# Patient Record
Sex: FEMALE | Race: WHITE | Employment: FULL TIME | URBAN - METROPOLITAN AREA
[De-identification: names, ages, dates, MRNs, and addresses within clinical notes are randomized per-mention and may not be internally consistent; named-entity substitution may affect disease eponyms.]

---

## 2020-01-04 ENCOUNTER — HOSPITAL ENCOUNTER (EMERGENCY)
Facility: HOSPITAL | Age: 50
Discharge: HOME | End: 2020-01-04
Attending: EMERGENCY MEDICINE
Payer: COMMERCIAL

## 2020-01-04 VITALS
SYSTOLIC BLOOD PRESSURE: 124 MMHG | HEART RATE: 84 BPM | TEMPERATURE: 97 F | RESPIRATION RATE: 16 BRPM | HEIGHT: 59 IN | OXYGEN SATURATION: 100 % | BODY MASS INDEX: 30.24 KG/M2 | WEIGHT: 150 LBS | DIASTOLIC BLOOD PRESSURE: 88 MMHG

## 2020-01-04 DIAGNOSIS — L02.91 ABSCESS: Primary | ICD-10-CM

## 2020-01-04 PROCEDURE — 87077 CULTURE AEROBIC IDENTIFY: CPT | Performed by: EMERGENCY MEDICINE

## 2020-01-04 PROCEDURE — 87205 SMEAR GRAM STAIN: CPT | Performed by: EMERGENCY MEDICINE

## 2020-01-04 PROCEDURE — 87070 CULTURE OTHR SPECIMN AEROBIC: CPT | Performed by: EMERGENCY MEDICINE

## 2020-01-04 PROCEDURE — 99283 EMERGENCY DEPT VISIT LOW MDM: CPT

## 2020-01-04 RX ORDER — CLINDAMYCIN HYDROCHLORIDE 300 MG/1
300 CAPSULE ORAL 3 TIMES DAILY
Qty: 30 CAPSULE | Refills: 0 | Status: SHIPPED | OUTPATIENT
Start: 2020-01-04 | End: 2020-01-14

## 2020-01-04 RX ORDER — LEVOTHYROXINE SODIUM 75 UG/1
CAPSULE ORAL
COMMUNITY

## 2020-01-04 NOTE — ED INITIAL ASSESSMENT (HPI)
Pt states she is currently being treated for an abscess under her R breast. Pt is taking oral antibiotics. Pt states this AM it started draining heavily with an strong odor.

## 2020-01-04 NOTE — ED PROVIDER NOTES
Patient Seen in: [facility] Emergency Department      History   Patient presents with:  Abscess    Stated Complaint: abscess under breast    HPI    Patient is a 73-year-old woman coming for evaluation for draining abscess underneath her bra line.   P toxic-appearing. HENT:      Head: Normocephalic and atraumatic. Eyes:      General: No scleral icterus. Conjunctiva/sclera: Conjunctivae normal.   Neck:      Musculoskeletal: Normal range of motion and neck supple.    Cardiovascular:      Rate and R problems, Return to the ER if you feel worse in any way        Medications Prescribed:  Current Discharge Medication List    START taking these medications    Clindamycin HCl 300 MG Oral Cap  Take 1 capsule (300 mg total) by mouth 3 (three) times daily for

## 2021-07-03 ENCOUNTER — PATIENT MESSAGE (OUTPATIENT)
Dept: FAMILY MEDICINE CLINIC | Facility: CLINIC | Age: 51
End: 2021-07-03

## 2021-07-03 ENCOUNTER — OFFICE VISIT (OUTPATIENT)
Dept: FAMILY MEDICINE CLINIC | Facility: CLINIC | Age: 51
End: 2021-07-03
Payer: COMMERCIAL

## 2021-07-03 VITALS
HEART RATE: 64 BPM | TEMPERATURE: 98 F | RESPIRATION RATE: 16 BRPM | SYSTOLIC BLOOD PRESSURE: 92 MMHG | DIASTOLIC BLOOD PRESSURE: 60 MMHG

## 2021-07-03 DIAGNOSIS — J30.2 SEASONAL ALLERGIC RHINITIS, UNSPECIFIED TRIGGER: ICD-10-CM

## 2021-07-03 DIAGNOSIS — R05.9 COUGH: Primary | ICD-10-CM

## 2021-07-03 LAB
CONTROL LINE PRESENT WITH A CLEAR BACKGROUND (YES/NO): YES YES/NO
KIT LOT #: NORMAL NUMERIC
STREP GRP A CUL-SCR: NEGATIVE

## 2021-07-03 PROCEDURE — 87880 STREP A ASSAY W/OPTIC: CPT | Performed by: NURSE PRACTITIONER

## 2021-07-03 PROCEDURE — 3074F SYST BP LT 130 MM HG: CPT | Performed by: NURSE PRACTITIONER

## 2021-07-03 PROCEDURE — 99213 OFFICE O/P EST LOW 20 MIN: CPT | Performed by: NURSE PRACTITIONER

## 2021-07-03 PROCEDURE — 3078F DIAST BP <80 MM HG: CPT | Performed by: NURSE PRACTITIONER

## 2021-07-03 RX ORDER — TRIAMCINOLONE ACETONIDE 55 UG/1
2 SPRAY, METERED NASAL 2 TIMES DAILY
Qty: 1 EACH | Refills: 1 | Status: SHIPPED | OUTPATIENT
Start: 2021-07-03 | End: 2021-10-15 | Stop reason: ALTCHOICE

## 2021-07-03 RX ORDER — BENZONATATE 200 MG/1
CAPSULE ORAL
Qty: 30 CAPSULE | Refills: 0 | Status: SHIPPED | OUTPATIENT
Start: 2021-07-03 | End: 2021-10-15 | Stop reason: ALTCHOICE

## 2021-07-03 NOTE — PROGRESS NOTES
CHIEF COMPLAINT:   \" nasal congestion, scratchy throat, cough \"  HPI:   Penny Pearl is a 53year old female who presents to the [facility] with symptoms of a runny, congested nose, a scratchy throat and a dry cough that seems to be caused by postnasal dr pain  NEURO: Denies headaches    EXAM:   BP 92/60   Pulse 64   Temp 98.4 °F (36.9 °C) (Temporal)   Resp 16   GENERAL: well developed, well nourished. Well appearing. SKIN: no rashes,no suspicious lesions  HEAD: atraumatic, normocephalic.    EYES: conjunct

## 2021-07-04 LAB — SARS-COV-2 RNA RESP QL NAA+PROBE: NOT DETECTED

## 2021-10-23 ENCOUNTER — TELEPHONE (OUTPATIENT)
Dept: OBGYN UNIT | Facility: HOSPITAL | Age: 51
End: 2021-10-23

## 2021-12-10 ENCOUNTER — TELEPHONE (OUTPATIENT)
Dept: PERINATAL CARE | Facility: HOSPITAL | Age: 51
End: 2021-12-10

## 2021-12-10 NOTE — TELEPHONE ENCOUNTER
I answered Mariza's question in regards to the COVID-19 vaccination booster. I strongly encouraged her to get this vaccine. I also answered her questions regarding gestational ages and risk of travel.   We discussed that air travel is not inherently ris

## 2021-12-10 NOTE — TELEPHONE ENCOUNTER
I left a message letting her know I was returning her phone call and provided my office phone number for her to return my call.

## 2021-12-15 NOTE — PROGRESS NOTES
Outpatient Maternal-Fetal Medicine Consultation    Dear Dr. Ashwin Booker,    Thank you for requesting ultrasound evaluation and maternal fetal medicine consultation on your patient Liang Graves.   As you are aware she is a 49year old female with a Singleto Metabolic disorder, or Sickle-cell anemia ([facility] [address] Past Surgical History  The patient  has a past surgical history that includes Eye surgery; skin graft procedure; other surgical history; and other surgical history.     Family History  The patient She antwan visualized and appeared short by transabdominal ultrasound, therefore transvaginal ultrasound was performed.  Transvaginal US findings: Cervix is closed, long and no funneling seen measuring 37.5 mm     The fetal measurements are consistent with the establi rate of:  · Fetal malformations  · Preeclampsia  · Gestational diabetes  · Intrauterine fetal death    As a result, enhanced pregnancy surveillance is advised for these patients including a comprehensive ultrasound to assess for fetal malformations (at 21 risk factors.      Fetal Death        A decision analysis tool using data from the [employer] [employer]  [employer] predicted a strategy of weekly antepartum testing and labor induction would lower the risk of unexplained fetal death in women 35 years o offers the highest detection rate (with the lowest false positive rate) for the detection of fetal aneuploidy amongst high-risk patients. The limitations of detailed mid-trimester sonography was reviewed with the patient.  First trimester screening and seco reference range with low free thyroxine [T4]) should be treated with thyroid hormone (T4).  In addition, because maternal euthyroidism is potentially important for normal fetal cognitive development, it is suggested to treat pregnant women with subclinical also been hypothesized that obesity may lead to dystocia due to increased soft tissue deposition in the maternal pelvis.     delivery in the obese  is associated with numerous perioperative concerns, including emergency delivery, prolonged in or stationary bike in the pregnancy. COVID-19 vaccine in pregnancy -   She got the booster dose of the COVID-19 vaccine on December 18, 2021. FIBROIDS:    I informed her of the fibroids.   Fibroids can increase in size during pregnancy and could l cavity was not violated, the patient is likely to still have an increased risk of uterine rupture. We discussed the recommended plan of care based on her  risk factors.   Vinh Jain had her questions answered to her satisfaction    IMPRESSION the practitioner.

## 2021-12-18 ENCOUNTER — HOSPITAL ENCOUNTER (EMERGENCY)
Facility: HOSPITAL | Age: 51
Discharge: HOME | End: 2021-12-18
Attending: EMERGENCY MEDICINE
Payer: COMMERCIAL

## 2021-12-18 ENCOUNTER — APPOINTMENT (OUTPATIENT)
Dept: ULTRASOUND IMAGING | Facility: HOSPITAL | Age: 51
End: 2021-12-18
Attending: EMERGENCY MEDICINE
Payer: COMMERCIAL

## 2021-12-18 VITALS
HEART RATE: 88 BPM | SYSTOLIC BLOOD PRESSURE: 116 MMHG | OXYGEN SATURATION: 99 % | WEIGHT: 146 LBS | RESPIRATION RATE: 16 BRPM | BODY MASS INDEX: 29.43 KG/M2 | HEIGHT: 59 IN | TEMPERATURE: 98 F | DIASTOLIC BLOOD PRESSURE: 77 MMHG

## 2021-12-18 DIAGNOSIS — O20.0 THREATENED MISCARRIAGE: Primary | ICD-10-CM

## 2021-12-18 PROCEDURE — 96360 HYDRATION IV INFUSION INIT: CPT

## 2021-12-18 PROCEDURE — 76815 OB US LIMITED FETUS(S): CPT | Performed by: EMERGENCY MEDICINE

## 2021-12-18 PROCEDURE — 86900 BLOOD TYPING SEROLOGIC ABO: CPT | Performed by: EMERGENCY MEDICINE

## 2021-12-18 PROCEDURE — 96361 HYDRATE IV INFUSION ADD-ON: CPT

## 2021-12-18 PROCEDURE — 85025 COMPLETE CBC W/AUTO DIFF WBC: CPT | Performed by: EMERGENCY MEDICINE

## 2021-12-18 PROCEDURE — 84702 CHORIONIC GONADOTROPIN TEST: CPT | Performed by: EMERGENCY MEDICINE

## 2021-12-18 PROCEDURE — 80053 COMPREHEN METABOLIC PANEL: CPT | Performed by: EMERGENCY MEDICINE

## 2021-12-18 PROCEDURE — 99285 EMERGENCY DEPT VISIT HI MDM: CPT

## 2021-12-18 PROCEDURE — 86901 BLOOD TYPING SEROLOGIC RH(D): CPT | Performed by: EMERGENCY MEDICINE

## 2021-12-18 PROCEDURE — 99284 EMERGENCY DEPT VISIT MOD MDM: CPT

## 2021-12-19 NOTE — ED PROVIDER NOTES
Patient Seen in: [facility] Emergency Department      History   Patient presents with:  Pregnancy Issues    Stated Complaint: vaginal bleeding with clots    Subjective:   HPI    The patient is a 16-year-old pregnant G1, P0 female who is EGA is 23 we BMI 29.49 kg/m²         Physical Exam    General: Comfortable and well appearing. Alert and oriented in no distress. Neuro: No focal neurologic deficits.  Grossly normal and symmetric motor strength and sensation proximally and distally throughout all 4 ex ---------                               -----------         ------                     CBC W/ DIFFERENTIAL[[id number]]          Abnormal            Final result                 Please view results for these tests on the individual orders.    [address] (BL There is no other abnormality detected on this limited ultrasound.                      Dictated by (CST): Abram Saldaña MD on 12/18/2021 at 9:18 PM         Finalized by (CST): Abram Saldaña MD on 12/18/2021 at 9:21 PM              Patient did not have any

## 2021-12-21 ENCOUNTER — ULTRASOUND ENCOUNTER (OUTPATIENT)
Dept: PERINATAL CARE | Facility: HOSPITAL | Age: 51
End: 2021-12-21
Attending: OBSTETRICS & GYNECOLOGY
Payer: COMMERCIAL

## 2021-12-21 VITALS
HEART RATE: 105 BPM | WEIGHT: 150 LBS | DIASTOLIC BLOOD PRESSURE: 79 MMHG | SYSTOLIC BLOOD PRESSURE: 113 MMHG | BODY MASS INDEX: 30 KG/M2

## 2021-12-21 DIAGNOSIS — O09.812 PREGNANCY RESULTING FROM ASSISTED REPRODUCTIVE TECHNOLOGY IN SECOND TRIMESTER: ICD-10-CM

## 2021-12-21 DIAGNOSIS — O09.512 PRIMIGRAVIDA OF ADVANCED MATERNAL AGE IN SECOND TRIMESTER: ICD-10-CM

## 2021-12-21 DIAGNOSIS — O09.522 MULTIGRAVIDA OF ADVANCED MATERNAL AGE IN SECOND TRIMESTER: Primary | ICD-10-CM

## 2021-12-21 DIAGNOSIS — O99.282 HYPOTHYROIDISM AFFECTING PREGNANCY IN SECOND TRIMESTER: ICD-10-CM

## 2021-12-21 DIAGNOSIS — O34.12 LEIOMYOMA OF UTERUS AFFECTING PREGNANCY IN SECOND TRIMESTER: ICD-10-CM

## 2021-12-21 DIAGNOSIS — O09.522 MULTIGRAVIDA OF ADVANCED MATERNAL AGE IN SECOND TRIMESTER: ICD-10-CM

## 2021-12-21 DIAGNOSIS — D25.9 LEIOMYOMA OF UTERUS AFFECTING PREGNANCY IN SECOND TRIMESTER: ICD-10-CM

## 2021-12-21 DIAGNOSIS — E03.9 HYPOTHYROIDISM AFFECTING PREGNANCY IN SECOND TRIMESTER: ICD-10-CM

## 2021-12-21 DIAGNOSIS — O09.819 ENCOUNTER FOR SUPERVISION OF PREGNANCY RESULTING FROM ASSISTED REPRODUCTIVE TECHNOLOGY: ICD-10-CM

## 2021-12-21 PROCEDURE — 76817 TRANSVAGINAL US OBSTETRIC: CPT

## 2021-12-21 PROCEDURE — 99244 OFF/OP CNSLTJ NEW/EST MOD 40: CPT | Performed by: OBSTETRICS & GYNECOLOGY

## 2021-12-21 PROCEDURE — 76811 OB US DETAILED SNGL FETUS: CPT | Performed by: OBSTETRICS & GYNECOLOGY

## 2021-12-21 PROCEDURE — 76817 TRANSVAGINAL US OBSTETRIC: CPT | Performed by: OBSTETRICS & GYNECOLOGY

## 2021-12-21 NOTE — PROGRESS NOTES
Pt here for Level II Ultrasound/IVF/AMA/Hypothyroid  Pt denies fm   Pt c/o intermittent nausea, pt taking Diclegis. Pt was seen in ER 12/18 for bleeding/clots,pt noted no recurrent bleeding noted. Abd soft,nontender. Pt denies UC's, pain.

## 2022-01-14 NOTE — PROGRESS NOTES
Romario Mederos     Dear Dr. Home Tinajero,     Thank you for requesting ultrasound evaluation and maternal fetal medicine consultation on your patient Debora Rivas.   As you are aware she is a 49year old female  with a [place] There is a right to left shunting across the patent fritz ovale. There is a normal appearance of the aorta and branching pattern of the head vessels. There appears to be a structurally  normal fetal heart and rhythm.  The patient was made aware of the l reviewed. See MFM note from 2021 for detailed review     OBESITY:  Her BMI prior to pregnancy was 31.3  Obesity during pregnancy is associated with numerous maternal and  risks which were discussed previously and reviewed.   See M note fro care.  Our discussion is summarized above. The approximate physician face-to-face time was 30 minutes. Note to patient and family:  The [facility] makes medical notes available to patients in the interest of transparency.   However, please b

## 2022-01-21 ENCOUNTER — ULTRASOUND ENCOUNTER (OUTPATIENT)
Dept: PERINATAL CARE | Facility: HOSPITAL | Age: 52
End: 2022-01-21
Attending: OBSTETRICS & GYNECOLOGY
Payer: COMMERCIAL

## 2022-01-21 VITALS
SYSTOLIC BLOOD PRESSURE: 119 MMHG | BODY MASS INDEX: 31 KG/M2 | DIASTOLIC BLOOD PRESSURE: 76 MMHG | WEIGHT: 153 LBS | HEART RATE: 123 BPM

## 2022-01-21 DIAGNOSIS — O09.522 MULTIGRAVIDA OF ADVANCED MATERNAL AGE IN SECOND TRIMESTER: ICD-10-CM

## 2022-01-21 DIAGNOSIS — O09.522 MULTIGRAVIDA OF ADVANCED MATERNAL AGE IN SECOND TRIMESTER: Primary | ICD-10-CM

## 2022-01-21 DIAGNOSIS — D25.9 LEIOMYOMA OF UTERUS AFFECTING PREGNANCY IN SECOND TRIMESTER: ICD-10-CM

## 2022-01-21 DIAGNOSIS — E03.9 HYPOTHYROIDISM AFFECTING PREGNANCY IN SECOND TRIMESTER: ICD-10-CM

## 2022-01-21 DIAGNOSIS — O09.819 ENCOUNTER FOR SUPERVISION OF PREGNANCY RESULTING FROM ASSISTED REPRODUCTIVE TECHNOLOGY: ICD-10-CM

## 2022-01-21 DIAGNOSIS — O34.12 LEIOMYOMA OF UTERUS AFFECTING PREGNANCY IN SECOND TRIMESTER: ICD-10-CM

## 2022-01-21 DIAGNOSIS — Z98.890 H/O MYOMECTOMY: ICD-10-CM

## 2022-01-21 DIAGNOSIS — O99.282 HYPOTHYROIDISM AFFECTING PREGNANCY IN SECOND TRIMESTER: ICD-10-CM

## 2022-01-21 DIAGNOSIS — O09.513 PRIMIGRAVIDA OF ADVANCED MATERNAL AGE IN THIRD TRIMESTER: ICD-10-CM

## 2022-01-21 DIAGNOSIS — O09.812 PREGNANCY RESULTING FROM ASSISTED REPRODUCTIVE TECHNOLOGY IN SECOND TRIMESTER: ICD-10-CM

## 2022-01-21 PROBLEM — O09.519 AMA (ADVANCED MATERNAL AGE) PRIMIGRAVIDA 35+: Status: ACTIVE | Noted: 2022-01-21

## 2022-01-21 PROBLEM — E03.8 OTHER SPECIFIED HYPOTHYROIDISM: Status: ACTIVE | Noted: 2022-01-21

## 2022-01-21 PROBLEM — O09.519 AMA (ADVANCED MATERNAL AGE) PRIMIGRAVIDA 35+ (HCC): Status: ACTIVE | Noted: 2022-01-21

## 2022-01-21 PROCEDURE — 76825 ECHO EXAM OF FETAL HEART: CPT | Performed by: OBSTETRICS & GYNECOLOGY

## 2022-01-21 PROCEDURE — 76816 OB US FOLLOW-UP PER FETUS: CPT | Performed by: OBSTETRICS & GYNECOLOGY

## 2022-01-21 PROCEDURE — 76827 ECHO EXAM OF FETAL HEART: CPT | Performed by: OBSTETRICS & GYNECOLOGY

## 2022-01-21 PROCEDURE — 76816 OB US FOLLOW-UP PER FETUS: CPT

## 2022-01-21 PROCEDURE — 76827 ECHO EXAM OF FETAL HEART: CPT

## 2022-01-21 PROCEDURE — 93325 DOPPLER ECHO COLOR FLOW MAPG: CPT | Performed by: OBSTETRICS & GYNECOLOGY

## 2022-01-21 PROCEDURE — 93325 DOPPLER ECHO COLOR FLOW MAPG: CPT

## 2022-01-21 PROCEDURE — 99214 OFFICE O/P EST MOD 30 MIN: CPT | Performed by: OBSTETRICS & GYNECOLOGY

## 2022-01-23 ENCOUNTER — HOSPITAL ENCOUNTER (OUTPATIENT)
Age: 52
Discharge: HOME | End: 2022-01-23
Payer: COMMERCIAL

## 2022-01-23 VITALS
TEMPERATURE: 97 F | HEART RATE: 99 BPM | SYSTOLIC BLOOD PRESSURE: 118 MMHG | HEIGHT: 60 IN | DIASTOLIC BLOOD PRESSURE: 70 MMHG | WEIGHT: 151 LBS | BODY MASS INDEX: 29.64 KG/M2 | OXYGEN SATURATION: 98 % | RESPIRATION RATE: 16 BRPM

## 2022-01-23 DIAGNOSIS — Z20.822 EXPOSURE TO COVID-19 VIRUS: Primary | ICD-10-CM

## 2022-01-23 DIAGNOSIS — U07.1 COVID-19: ICD-10-CM

## 2022-01-23 LAB — SARS-COV-2 RNA RESP QL NAA+PROBE: DETECTED

## 2022-01-23 PROCEDURE — 99213 OFFICE O/P EST LOW 20 MIN: CPT | Performed by: NURSE PRACTITIONER

## 2022-01-23 PROCEDURE — U0002 COVID-19 LAB TEST NON-CDC: HCPCS | Performed by: NURSE PRACTITIONER

## 2022-01-23 RX ORDER — ASPIRIN 81 MG/1
81 TABLET, CHEWABLE ORAL DAILY
COMMUNITY

## 2022-01-23 NOTE — ED INITIAL ASSESSMENT (HPI)
Patient c/o nasal congestion, sore throat and cough for 1 week. States she took a home Covid test today that was +. Patient is 6 months pregnant.

## 2022-01-23 NOTE — ED PROVIDER NOTES
Patient Seen in: [facility]      History   Patient presents with:  Testing    Stated Complaint: Testing    Subjective:   69-year-old female presents today with body aches, fatigue congestion and sore throat. Concern for possible COVID-19.   St Resp 16   Temp 97 °F (36.1 °C)   Temp src Temporal   SpO2 98 %   O2 Device None (Room air)       Current:/70   Pulse 99   Temp 97 °F (36.1 °C) (Temporal)   Resp 16   Ht 152.4 cm (5')   Wt 68.5 kg   SpO2 98%   Breastfeeding No   BMI 29.49 kg/m² antihistamine cough suppressant as needed. To follow-up with primary MD 14 days after quarantine ends for reevaluation. Patient verbalized understanding agree with plan of care.                                 Disposition and Plan     Clinical Impression

## 2022-01-25 PROBLEM — O98.512 COVID-19 AFFECTING PREGNANCY IN SECOND TRIMESTER: Status: ACTIVE | Noted: 2022-01-23

## 2022-01-25 PROBLEM — U07.1 COVID-19 AFFECTING PREGNANCY IN SECOND TRIMESTER: Status: ACTIVE | Noted: 2022-01-23

## 2022-01-25 PROBLEM — U07.1 COVID-19 AFFECTING PREGNANCY IN SECOND TRIMESTER (HCC): Status: ACTIVE | Noted: 2022-01-23

## 2022-01-25 PROBLEM — O98.512 COVID-19 AFFECTING PREGNANCY IN SECOND TRIMESTER (HCC): Status: ACTIVE | Noted: 2022-01-23

## 2022-02-18 ENCOUNTER — OFFICE VISIT (OUTPATIENT)
Dept: PERINATAL CARE | Facility: HOSPITAL | Age: 52
End: 2022-02-18
Attending: OBSTETRICS & GYNECOLOGY
Payer: COMMERCIAL

## 2022-02-18 VITALS
DIASTOLIC BLOOD PRESSURE: 59 MMHG | WEIGHT: 153 LBS | SYSTOLIC BLOOD PRESSURE: 121 MMHG | HEART RATE: 122 BPM | HEIGHT: 59 IN | BODY MASS INDEX: 30.84 KG/M2

## 2022-02-18 DIAGNOSIS — O09.513 PRIMIGRAVIDA OF ADVANCED MATERNAL AGE IN THIRD TRIMESTER: ICD-10-CM

## 2022-02-18 DIAGNOSIS — O98.513 COVID-19 AFFECTING PREGNANCY IN THIRD TRIMESTER: ICD-10-CM

## 2022-02-18 DIAGNOSIS — O24.410 DIET CONTROLLED GESTATIONAL DIABETES MELLITUS (GDM) IN THIRD TRIMESTER: ICD-10-CM

## 2022-02-18 DIAGNOSIS — U07.1 COVID-19 AFFECTING PREGNANCY IN THIRD TRIMESTER: ICD-10-CM

## 2022-02-18 DIAGNOSIS — O34.12 LEIOMYOMA OF UTERUS AFFECTING PREGNANCY IN SECOND TRIMESTER: ICD-10-CM

## 2022-02-18 DIAGNOSIS — U07.1 COVID-19 AFFECTING PREGNANCY IN SECOND TRIMESTER: ICD-10-CM

## 2022-02-18 DIAGNOSIS — O09.523 MULTIGRAVIDA OF ADVANCED MATERNAL AGE IN THIRD TRIMESTER: Primary | ICD-10-CM

## 2022-02-18 DIAGNOSIS — D25.9 LEIOMYOMA OF UTERUS AFFECTING PREGNANCY IN SECOND TRIMESTER: ICD-10-CM

## 2022-02-18 DIAGNOSIS — O24.419 GDM (GESTATIONAL DIABETES MELLITUS): ICD-10-CM

## 2022-02-18 DIAGNOSIS — O98.512 COVID-19 AFFECTING PREGNANCY IN SECOND TRIMESTER: ICD-10-CM

## 2022-02-18 DIAGNOSIS — Z98.890 H/O MYOMECTOMY: ICD-10-CM

## 2022-02-18 DIAGNOSIS — O09.813 PREGNANCY RESULTING FROM ASSISTED REPRODUCTIVE TECHNOLOGY IN THIRD TRIMESTER: ICD-10-CM

## 2022-02-18 DIAGNOSIS — O09.523 MULTIGRAVIDA OF ADVANCED MATERNAL AGE IN THIRD TRIMESTER: ICD-10-CM

## 2022-02-18 DIAGNOSIS — O99.213 OBESITY AFFECTING PREGNANCY IN THIRD TRIMESTER: ICD-10-CM

## 2022-02-18 PROCEDURE — 99214 OFFICE O/P EST MOD 30 MIN: CPT | Performed by: OBSTETRICS & GYNECOLOGY

## 2022-02-18 PROCEDURE — 76816 OB US FOLLOW-UP PER FETUS: CPT | Performed by: OBSTETRICS & GYNECOLOGY

## 2022-03-18 ENCOUNTER — ULTRASOUND ENCOUNTER (OUTPATIENT)
Dept: PERINATAL CARE | Facility: HOSPITAL | Age: 52
End: 2022-03-18
Attending: OBSTETRICS & GYNECOLOGY
Payer: COMMERCIAL

## 2022-03-18 VITALS
HEART RATE: 108 BPM | SYSTOLIC BLOOD PRESSURE: 116 MMHG | DIASTOLIC BLOOD PRESSURE: 78 MMHG | WEIGHT: 153.38 LBS | HEIGHT: 59 IN | BODY MASS INDEX: 30.92 KG/M2

## 2022-03-18 DIAGNOSIS — D25.9 LEIOMYOMA OF UTERUS AFFECTING PREGNANCY IN SECOND TRIMESTER: ICD-10-CM

## 2022-03-18 DIAGNOSIS — O34.13 LEIOMYOMA OF UTERUS AFFECTING PREGNANCY IN THIRD TRIMESTER: ICD-10-CM

## 2022-03-18 DIAGNOSIS — U07.1 COVID-19 AFFECTING PREGNANCY IN THIRD TRIMESTER: ICD-10-CM

## 2022-03-18 DIAGNOSIS — Z98.890 H/O MYOMECTOMY: ICD-10-CM

## 2022-03-18 DIAGNOSIS — O98.513 COVID-19 AFFECTING PREGNANCY IN THIRD TRIMESTER: ICD-10-CM

## 2022-03-18 DIAGNOSIS — E03.9 HYPOTHYROIDISM AFFECTING PREGNANCY IN THIRD TRIMESTER: ICD-10-CM

## 2022-03-18 DIAGNOSIS — O99.283 HYPOTHYROIDISM AFFECTING PREGNANCY IN THIRD TRIMESTER: ICD-10-CM

## 2022-03-18 DIAGNOSIS — O09.813 PREGNANCY RESULTING FROM ASSISTED REPRODUCTIVE TECHNOLOGY IN THIRD TRIMESTER: ICD-10-CM

## 2022-03-18 DIAGNOSIS — D25.9 LEIOMYOMA OF UTERUS AFFECTING PREGNANCY IN THIRD TRIMESTER: ICD-10-CM

## 2022-03-18 DIAGNOSIS — O24.410 DIET CONTROLLED GESTATIONAL DIABETES MELLITUS (GDM) IN THIRD TRIMESTER: ICD-10-CM

## 2022-03-18 DIAGNOSIS — O09.513 PRIMIGRAVIDA OF ADVANCED MATERNAL AGE IN THIRD TRIMESTER: ICD-10-CM

## 2022-03-18 DIAGNOSIS — O34.12 LEIOMYOMA OF UTERUS AFFECTING PREGNANCY IN SECOND TRIMESTER: ICD-10-CM

## 2022-03-18 DIAGNOSIS — O24.410 DIET CONTROLLED GESTATIONAL DIABETES MELLITUS (GDM) IN THIRD TRIMESTER: Primary | ICD-10-CM

## 2022-03-18 PROCEDURE — 76816 OB US FOLLOW-UP PER FETUS: CPT | Performed by: OBSTETRICS & GYNECOLOGY

## 2022-03-18 PROCEDURE — 99214 OFFICE O/P EST MOD 30 MIN: CPT | Performed by: OBSTETRICS & GYNECOLOGY

## 2022-03-18 PROCEDURE — 76819 FETAL BIOPHYS PROFIL W/O NST: CPT

## 2022-03-18 PROCEDURE — 76819 FETAL BIOPHYS PROFIL W/O NST: CPT | Performed by: OBSTETRICS & GYNECOLOGY

## 2022-03-18 RX ORDER — MELATONIN
325
COMMUNITY

## 2022-04-11 ENCOUNTER — HOSPITAL ENCOUNTER (OUTPATIENT)
Facility: HOSPITAL | Age: 52
Discharge: HOME | End: 2022-04-11
Attending: OBSTETRICS & GYNECOLOGY | Admitting: OBSTETRICS & GYNECOLOGY
Payer: COMMERCIAL

## 2022-04-11 PROCEDURE — 96372 THER/PROPH/DIAG INJ SC/IM: CPT

## 2022-04-11 RX ORDER — BETAMETHASONE SODIUM PHOSPHATE AND BETAMETHASONE ACETATE 3; 3 MG/ML; MG/ML
INJECTION, SUSPENSION INTRA-ARTICULAR; INTRALESIONAL; INTRAMUSCULAR; SOFT TISSUE
Status: COMPLETED
Start: 2022-04-11 | End: 2022-04-11

## 2022-04-11 RX ORDER — BETAMETHASONE SODIUM PHOSPHATE AND BETAMETHASONE ACETATE 3; 3 MG/ML; MG/ML
12 INJECTION, SUSPENSION INTRA-ARTICULAR; INTRALESIONAL; INTRAMUSCULAR; SOFT TISSUE ONCE
Status: COMPLETED | OUTPATIENT
Start: 2022-04-11 | End: 2022-04-11

## 2022-04-12 ENCOUNTER — HOSPITAL ENCOUNTER (OUTPATIENT)
Facility: HOSPITAL | Age: 52
Discharge: HOME | End: 2022-04-12
Attending: OBSTETRICS & GYNECOLOGY | Admitting: OBSTETRICS & GYNECOLOGY
Payer: COMMERCIAL

## 2022-04-12 ENCOUNTER — TELEPHONE (OUTPATIENT)
Dept: OBGYN UNIT | Facility: HOSPITAL | Age: 52
End: 2022-04-12

## 2022-04-12 PROCEDURE — 96372 THER/PROPH/DIAG INJ SC/IM: CPT

## 2022-04-12 RX ORDER — BETAMETHASONE SODIUM PHOSPHATE AND BETAMETHASONE ACETATE 3; 3 MG/ML; MG/ML
INJECTION, SUSPENSION INTRA-ARTICULAR; INTRALESIONAL; INTRAMUSCULAR; SOFT TISSUE
Status: COMPLETED
Start: 2022-04-12 | End: 2022-04-12

## 2022-04-12 RX ORDER — BETAMETHASONE SODIUM PHOSPHATE AND BETAMETHASONE ACETATE 3; 3 MG/ML; MG/ML
12 INJECTION, SUSPENSION INTRA-ARTICULAR; INTRALESIONAL; INTRAMUSCULAR; SOFT TISSUE ONCE
Status: COMPLETED | OUTPATIENT
Start: 2022-04-12 | End: 2022-04-12

## 2022-04-12 NOTE — PROGRESS NOTES
Pt here for first Betamethasone shot. Given to pt in right upper gluteal. Questions answered regarding future admission and delivery. Pt escorted out by this RN.

## 2022-04-13 NOTE — PROGRESS NOTES
Pt admitted to Trg 3 for 2nd betamethasone shot. See MAR. Questions answered. Pt verbalized understanding of instructions given.

## 2022-04-18 ENCOUNTER — ANESTHESIA (OUTPATIENT)
Dept: OBGYN UNIT | Facility: HOSPITAL | Age: 52
End: 2022-04-18
Payer: COMMERCIAL

## 2022-04-18 ENCOUNTER — HOSPITAL ENCOUNTER (INPATIENT)
Facility: HOSPITAL | Age: 52
LOS: 3 days | Discharge: HOME | End: 2022-04-21
Attending: OBSTETRICS & GYNECOLOGY | Admitting: OBSTETRICS & GYNECOLOGY
Payer: COMMERCIAL

## 2022-04-18 ENCOUNTER — ANESTHESIA EVENT (OUTPATIENT)
Dept: OBGYN UNIT | Facility: HOSPITAL | Age: 52
End: 2022-04-18
Payer: COMMERCIAL

## 2022-04-18 PROBLEM — Z34.90 PREGNANCY (HCC): Status: ACTIVE | Noted: 2022-04-18

## 2022-04-18 PROBLEM — Z34.90 PREGNANCY: Status: ACTIVE | Noted: 2022-04-18

## 2022-04-18 LAB
ANTIBODY SCREEN: NEGATIVE
BASOPHILS # BLD AUTO: 0.03 X10(3) UL (ref 0–0.2)
BASOPHILS # BLD AUTO: 0.03 X10(3) UL (ref 0–0.2)
BASOPHILS NFR BLD AUTO: 0.2 %
BASOPHILS NFR BLD AUTO: 0.3 %
EOSINOPHIL # BLD AUTO: 0.01 X10(3) UL (ref 0–0.7)
EOSINOPHIL # BLD AUTO: 0.13 X10(3) UL (ref 0–0.7)
EOSINOPHIL NFR BLD AUTO: 0.1 %
EOSINOPHIL NFR BLD AUTO: 1.3 %
ERYTHROCYTE [DISTWIDTH] IN BLOOD BY AUTOMATED COUNT: 14 %
ERYTHROCYTE [DISTWIDTH] IN BLOOD BY AUTOMATED COUNT: 14.1 %
GLUCOSE BLD-MCNC: 89 MG/DL (ref 70–99)
HCT VFR BLD AUTO: 35.3 %
HCT VFR BLD AUTO: 41.1 %
HGB BLD-MCNC: 11.3 G/DL
HGB BLD-MCNC: 13.5 G/DL
IMM GRANULOCYTES # BLD AUTO: 0.06 X10(3) UL (ref 0–1)
IMM GRANULOCYTES # BLD AUTO: 0.08 X10(3) UL (ref 0–1)
IMM GRANULOCYTES NFR BLD: 0.6 %
IMM GRANULOCYTES NFR BLD: 0.6 %
LYMPHOCYTES # BLD AUTO: 1.94 X10(3) UL (ref 1–4)
LYMPHOCYTES # BLD AUTO: 2.94 X10(3) UL (ref 1–4)
LYMPHOCYTES NFR BLD AUTO: 14.3 %
LYMPHOCYTES NFR BLD AUTO: 28.7 %
MCH RBC QN AUTO: 29.5 PG (ref 26–34)
MCH RBC QN AUTO: 29.6 PG (ref 26–34)
MCHC RBC AUTO-ENTMCNC: 32 G/DL (ref 31–37)
MCHC RBC AUTO-ENTMCNC: 32.8 G/DL (ref 31–37)
MCV RBC AUTO: 89.7 FL
MCV RBC AUTO: 92.4 FL
MONOCYTES # BLD AUTO: 0.83 X10(3) UL (ref 0.1–1)
MONOCYTES # BLD AUTO: 1.14 X10(3) UL (ref 0.1–1)
MONOCYTES NFR BLD AUTO: 8.1 %
MONOCYTES NFR BLD AUTO: 8.4 %
NEUTROPHILS # BLD AUTO: 10.37 X10 (3) UL (ref 1.5–7.7)
NEUTROPHILS # BLD AUTO: 10.37 X10(3) UL (ref 1.5–7.7)
NEUTROPHILS # BLD AUTO: 6.27 X10 (3) UL (ref 1.5–7.7)
NEUTROPHILS # BLD AUTO: 6.27 X10(3) UL (ref 1.5–7.7)
NEUTROPHILS NFR BLD AUTO: 61 %
NEUTROPHILS NFR BLD AUTO: 76.4 %
PLATELET # BLD AUTO: 204 10(3)UL (ref 150–450)
PLATELET # BLD AUTO: 249 10(3)UL (ref 150–450)
RBC # BLD AUTO: 3.82 X10(6)UL
RBC # BLD AUTO: 4.58 X10(6)UL
RH BLOOD TYPE: POSITIVE
T PALLIDUM AB SER QL IA: NONREACTIVE
WBC # BLD AUTO: 10.3 X10(3) UL (ref 4–11)
WBC # BLD AUTO: 13.6 X10(3) UL (ref 4–11)

## 2022-04-18 PROCEDURE — 86780 TREPONEMA PALLIDUM: CPT | Performed by: OBSTETRICS & GYNECOLOGY

## 2022-04-18 PROCEDURE — 86850 RBC ANTIBODY SCREEN: CPT | Performed by: OBSTETRICS & GYNECOLOGY

## 2022-04-18 PROCEDURE — 86900 BLOOD TYPING SEROLOGIC ABO: CPT | Performed by: OBSTETRICS & GYNECOLOGY

## 2022-04-18 PROCEDURE — 82947 ASSAY GLUCOSE BLOOD QUANT: CPT | Performed by: OBSTETRICS & GYNECOLOGY

## 2022-04-18 PROCEDURE — 85025 COMPLETE CBC W/AUTO DIFF WBC: CPT | Performed by: OBSTETRICS & GYNECOLOGY

## 2022-04-18 PROCEDURE — 88307 TISSUE EXAM BY PATHOLOGIST: CPT | Performed by: OBSTETRICS & GYNECOLOGY

## 2022-04-18 PROCEDURE — 86901 BLOOD TYPING SEROLOGIC RH(D): CPT | Performed by: OBSTETRICS & GYNECOLOGY

## 2022-04-18 RX ORDER — PHENYLEPHRINE HCL 10 MG/ML
VIAL (ML) INJECTION AS NEEDED
Status: DISCONTINUED | OUTPATIENT
Start: 2022-04-18 | End: 2022-04-18 | Stop reason: SURG

## 2022-04-18 RX ORDER — NALBUPHINE HCL 10 MG/ML
2.5 AMPUL (ML) INJECTION
Status: DISCONTINUED | OUTPATIENT
Start: 2022-04-18 | End: 2022-04-18

## 2022-04-18 RX ORDER — NALOXONE HYDROCHLORIDE 0.4 MG/ML
0.08 INJECTION, SOLUTION INTRAMUSCULAR; INTRAVENOUS; SUBCUTANEOUS
Status: ACTIVE | OUTPATIENT
Start: 2022-04-18 | End: 2022-04-19

## 2022-04-18 RX ORDER — METHYLERGONOVINE MALEATE 0.2 MG/ML
INJECTION INTRAVENOUS
Status: COMPLETED
Start: 2022-04-18 | End: 2022-04-18

## 2022-04-18 RX ORDER — METOCLOPRAMIDE HYDROCHLORIDE 5 MG/ML
INJECTION INTRAMUSCULAR; INTRAVENOUS AS NEEDED
Status: DISCONTINUED | OUTPATIENT
Start: 2022-04-18 | End: 2022-04-18 | Stop reason: SURG

## 2022-04-18 RX ORDER — NALBUPHINE HCL 10 MG/ML
2.5 AMPUL (ML) INJECTION EVERY 4 HOURS PRN
Status: DISCONTINUED | OUTPATIENT
Start: 2022-04-18 | End: 2022-04-21

## 2022-04-18 RX ORDER — KETOROLAC TROMETHAMINE 30 MG/ML
INJECTION, SOLUTION INTRAMUSCULAR; INTRAVENOUS
Status: COMPLETED
Start: 2022-04-18 | End: 2022-04-18

## 2022-04-18 RX ORDER — SODIUM CHLORIDE, SODIUM LACTATE, POTASSIUM CHLORIDE, CALCIUM CHLORIDE 600; 310; 30; 20 MG/100ML; MG/100ML; MG/100ML; MG/100ML
INJECTION, SOLUTION INTRAVENOUS CONTINUOUS
Status: DISCONTINUED | OUTPATIENT
Start: 2022-04-18 | End: 2022-04-18

## 2022-04-18 RX ORDER — LEVOTHYROXINE SODIUM 0.07 MG/1
75 TABLET ORAL
Status: DISCONTINUED | OUTPATIENT
Start: 2022-04-18 | End: 2022-04-21

## 2022-04-18 RX ORDER — ONDANSETRON 2 MG/ML
4 INJECTION INTRAMUSCULAR; INTRAVENOUS ONCE AS NEEDED
Status: DISCONTINUED | OUTPATIENT
Start: 2022-04-18 | End: 2022-04-18 | Stop reason: HOSPADM

## 2022-04-18 RX ORDER — LEVOTHYROXINE SODIUM 0.15 MG/1
150 TABLET ORAL
Status: DISCONTINUED | OUTPATIENT
Start: 2022-04-18 | End: 2022-04-18

## 2022-04-18 RX ORDER — LEVOTHYROXINE SODIUM 0.07 MG/1
75 TABLET ORAL
Status: DISCONTINUED | OUTPATIENT
Start: 2022-04-19 | End: 2022-04-18

## 2022-04-18 RX ORDER — EPHEDRINE SULFATE 50 MG/ML
INJECTION INTRAVENOUS AS NEEDED
Status: DISCONTINUED | OUTPATIENT
Start: 2022-04-18 | End: 2022-04-18 | Stop reason: SURG

## 2022-04-18 RX ORDER — LEVOTHYROXINE SODIUM 0.07 MG/1
150 TABLET ORAL
Status: DISCONTINUED | OUTPATIENT
Start: 2022-04-23 | End: 2022-04-21

## 2022-04-18 RX ORDER — IBUPROFEN 600 MG/1
600 TABLET ORAL EVERY 6 HOURS
Status: DISCONTINUED | OUTPATIENT
Start: 2022-04-19 | End: 2022-04-21

## 2022-04-18 RX ORDER — DIPHENHYDRAMINE HYDROCHLORIDE 50 MG/ML
12.5 INJECTION INTRAMUSCULAR; INTRAVENOUS EVERY 4 HOURS PRN
Status: DISCONTINUED | OUTPATIENT
Start: 2022-04-18 | End: 2022-04-21

## 2022-04-18 RX ORDER — HYDROMORPHONE HYDROCHLORIDE 1 MG/ML
0.4 INJECTION, SOLUTION INTRAMUSCULAR; INTRAVENOUS; SUBCUTANEOUS EVERY 5 MIN PRN
Status: DISCONTINUED | OUTPATIENT
Start: 2022-04-18 | End: 2022-04-18 | Stop reason: HOSPADM

## 2022-04-18 RX ORDER — DEXTROSE, SODIUM CHLORIDE, SODIUM LACTATE, POTASSIUM CHLORIDE, AND CALCIUM CHLORIDE 5; .6; .31; .03; .02 G/100ML; G/100ML; G/100ML; G/100ML; G/100ML
INJECTION, SOLUTION INTRAVENOUS CONTINUOUS PRN
Status: DISCONTINUED | OUTPATIENT
Start: 2022-04-18 | End: 2022-04-21

## 2022-04-18 RX ORDER — DIPHENHYDRAMINE HYDROCHLORIDE 50 MG/ML
25 INJECTION INTRAMUSCULAR; INTRAVENOUS ONCE AS NEEDED
Status: COMPLETED | OUTPATIENT
Start: 2022-04-18 | End: 2022-04-18

## 2022-04-18 RX ORDER — ONDANSETRON 2 MG/ML
INJECTION INTRAMUSCULAR; INTRAVENOUS AS NEEDED
Status: DISCONTINUED | OUTPATIENT
Start: 2022-04-18 | End: 2022-04-18 | Stop reason: SURG

## 2022-04-18 RX ORDER — BUPIVACAINE HYDROCHLORIDE 7.5 MG/ML
INJECTION, SOLUTION INTRASPINAL AS NEEDED
Status: DISCONTINUED | OUTPATIENT
Start: 2022-04-18 | End: 2022-04-18 | Stop reason: SURG

## 2022-04-18 RX ORDER — SODIUM CHLORIDE, SODIUM LACTATE, POTASSIUM CHLORIDE, CALCIUM CHLORIDE 600; 310; 30; 20 MG/100ML; MG/100ML; MG/100ML; MG/100ML
125 INJECTION, SOLUTION INTRAVENOUS CONTINUOUS
Status: DISCONTINUED | OUTPATIENT
Start: 2022-04-18 | End: 2022-04-18 | Stop reason: HOSPADM

## 2022-04-18 RX ORDER — DEXTROSE MONOHYDRATE 25 G/50ML
50 INJECTION, SOLUTION INTRAVENOUS
Status: DISCONTINUED | OUTPATIENT
Start: 2022-04-18 | End: 2022-04-18 | Stop reason: HOSPADM

## 2022-04-18 RX ORDER — HYDROCODONE BITARTRATE AND ACETAMINOPHEN 5; 325 MG/1; MG/1
1 TABLET ORAL EVERY 6 HOURS PRN
Status: DISCONTINUED | OUTPATIENT
Start: 2022-04-18 | End: 2022-04-21

## 2022-04-18 RX ORDER — DEXAMETHASONE SODIUM PHOSPHATE 4 MG/ML
VIAL (ML) INJECTION AS NEEDED
Status: DISCONTINUED | OUTPATIENT
Start: 2022-04-18 | End: 2022-04-18 | Stop reason: SURG

## 2022-04-18 RX ORDER — ONDANSETRON 2 MG/ML
4 INJECTION INTRAMUSCULAR; INTRAVENOUS EVERY 6 HOURS PRN
Status: DISCONTINUED | OUTPATIENT
Start: 2022-04-18 | End: 2022-04-21

## 2022-04-18 RX ORDER — ACETAMINOPHEN 500 MG
1000 TABLET ORAL ONCE
Status: COMPLETED | OUTPATIENT
Start: 2022-04-18 | End: 2022-04-18

## 2022-04-18 RX ORDER — SIMETHICONE 80 MG
80 TABLET,CHEWABLE ORAL 3 TIMES DAILY PRN
Status: DISCONTINUED | OUTPATIENT
Start: 2022-04-18 | End: 2022-04-21

## 2022-04-18 RX ORDER — DIPHENHYDRAMINE HCL 25 MG
25 CAPSULE ORAL EVERY 4 HOURS PRN
Status: DISCONTINUED | OUTPATIENT
Start: 2022-04-18 | End: 2022-04-21

## 2022-04-18 RX ORDER — ACETAMINOPHEN 500 MG
1000 TABLET ORAL EVERY 6 HOURS
Status: DISCONTINUED | OUTPATIENT
Start: 2022-04-18 | End: 2022-04-21

## 2022-04-18 RX ORDER — MORPHINE SULFATE 2 MG/ML
INJECTION, SOLUTION INTRAMUSCULAR; INTRAVENOUS AS NEEDED
Status: DISCONTINUED | OUTPATIENT
Start: 2022-04-18 | End: 2022-04-18 | Stop reason: SURG

## 2022-04-18 RX ORDER — METHYLERGONOVINE MALEATE 0.2 MG/ML
0.2 INJECTION INTRAVENOUS ONCE
Status: COMPLETED | OUTPATIENT
Start: 2022-04-18 | End: 2022-04-18

## 2022-04-18 RX ORDER — CEFAZOLIN SODIUM/WATER 2 G/20 ML
2 SYRINGE (ML) INTRAVENOUS ONCE
Status: COMPLETED | OUTPATIENT
Start: 2022-04-18 | End: 2022-04-18

## 2022-04-18 RX ORDER — KETOROLAC TROMETHAMINE 30 MG/ML
30 INJECTION, SOLUTION INTRAMUSCULAR; INTRAVENOUS EVERY 6 HOURS
Status: DISPENSED | OUTPATIENT
Start: 2022-04-18 | End: 2022-04-19

## 2022-04-18 RX ORDER — TRISODIUM CITRATE DIHYDRATE AND CITRIC ACID MONOHYDRATE 500; 334 MG/5ML; MG/5ML
30 SOLUTION ORAL ONCE
Status: COMPLETED | OUTPATIENT
Start: 2022-04-18 | End: 2022-04-18

## 2022-04-18 RX ORDER — DOCUSATE SODIUM 100 MG/1
100 CAPSULE, LIQUID FILLED ORAL
Status: DISCONTINUED | OUTPATIENT
Start: 2022-04-18 | End: 2022-04-21

## 2022-04-18 RX ORDER — METOCLOPRAMIDE HYDROCHLORIDE 5 MG/ML
10 INJECTION INTRAMUSCULAR; INTRAVENOUS EVERY 6 HOURS PRN
Status: DISCONTINUED | OUTPATIENT
Start: 2022-04-18 | End: 2022-04-21

## 2022-04-18 RX ORDER — NICOTINE POLACRILEX 4 MG
15 LOZENGE BUCCAL
Status: DISCONTINUED | OUTPATIENT
Start: 2022-04-18 | End: 2022-04-18 | Stop reason: HOSPADM

## 2022-04-18 RX ORDER — BISACODYL 10 MG
10 SUPPOSITORY, RECTAL RECTAL ONCE AS NEEDED
Status: DISCONTINUED | OUTPATIENT
Start: 2022-04-18 | End: 2022-04-21

## 2022-04-18 RX ORDER — KETOROLAC TROMETHAMINE 30 MG/ML
30 INJECTION, SOLUTION INTRAMUSCULAR; INTRAVENOUS ONCE AS NEEDED
Status: COMPLETED | OUTPATIENT
Start: 2022-04-18 | End: 2022-04-18

## 2022-04-18 RX ORDER — NICOTINE POLACRILEX 4 MG
30 LOZENGE BUCCAL
Status: DISCONTINUED | OUTPATIENT
Start: 2022-04-18 | End: 2022-04-18 | Stop reason: HOSPADM

## 2022-04-18 RX ORDER — ONDANSETRON 2 MG/ML
4 INJECTION INTRAMUSCULAR; INTRAVENOUS EVERY 6 HOURS PRN
Status: DISCONTINUED | OUTPATIENT
Start: 2022-04-18 | End: 2022-04-18 | Stop reason: HOSPADM

## 2022-04-18 RX ADMIN — PHENYLEPHRINE HCL 100 MCG: 10 MG/ML VIAL (ML) INJECTION at 09:49:00

## 2022-04-18 RX ADMIN — METOCLOPRAMIDE HYDROCHLORIDE 10 MG: 5 INJECTION INTRAMUSCULAR; INTRAVENOUS at 10:17:00

## 2022-04-18 RX ADMIN — PHENYLEPHRINE HCL 100 MCG: 10 MG/ML VIAL (ML) INJECTION at 09:41:00

## 2022-04-18 RX ADMIN — BUPIVACAINE HYDROCHLORIDE 1.4 ML: 7.5 INJECTION, SOLUTION INTRASPINAL at 09:35:00

## 2022-04-18 RX ADMIN — EPHEDRINE SULFATE 5 MG: 50 INJECTION INTRAVENOUS at 10:04:00

## 2022-04-18 RX ADMIN — PHENYLEPHRINE HCL 50 MCG: 10 MG/ML VIAL (ML) INJECTION at 10:04:00

## 2022-04-18 RX ADMIN — METHYLERGONOVINE MALEATE 0.2 MG: 0.2 INJECTION INTRAVENOUS at 10:03:00

## 2022-04-18 RX ADMIN — PHENYLEPHRINE HCL 50 MCG: 10 MG/ML VIAL (ML) INJECTION at 10:10:00

## 2022-04-18 RX ADMIN — CEFAZOLIN SODIUM/WATER 2 G: 2 G/20 ML SYRINGE (ML) INTRAVENOUS at 09:39:00

## 2022-04-18 RX ADMIN — PHENYLEPHRINE HCL 50 MCG: 10 MG/ML VIAL (ML) INJECTION at 09:56:00

## 2022-04-18 RX ADMIN — SODIUM CHLORIDE, SODIUM LACTATE, POTASSIUM CHLORIDE, CALCIUM CHLORIDE: 600; 310; 30; 20 INJECTION, SOLUTION INTRAVENOUS at 10:32:00

## 2022-04-18 RX ADMIN — ONDANSETRON 4 MG: 2 INJECTION INTRAMUSCULAR; INTRAVENOUS at 09:41:00

## 2022-04-18 RX ADMIN — DEXAMETHASONE SODIUM PHOSPHATE 4 MG: 4 MG/ML VIAL (ML) INJECTION at 09:41:00

## 2022-04-18 RX ADMIN — EPHEDRINE SULFATE 10 MG: 50 INJECTION INTRAVENOUS at 09:56:00

## 2022-04-18 RX ADMIN — MORPHINE SULFATE 0.2 MG: 2 INJECTION, SOLUTION INTRAMUSCULAR; INTRAVENOUS at 09:35:00

## 2022-04-18 NOTE — OPERATIVE REPORT
[address]    PATIENT'S NAME: Herbie Deleon   ATTENDING PHYSICIAN: Alan Sacks, M.D. OPERATING PHYSICIAN: Alan Sacks, M.D. PATIENT ACCOUNT#:   [de-identified]    LOCATION:  [address] [place] [address]  MEDICAL RECORD #:   [id number]       YOB: 1970  ADMISSION DATE:       2022      OPERATION DATE:  2022    OPERATIVE REPORT    (With Addendum added 2022)    PREOPERATIVE DIAGNOSIS:  A 37 week 1 day intrauterine pregnancy, previous myomectomy. POSTOPERATIVE DIAGNOSIS:  A 37 week 1 day intrauterine pregnancy, previous myomectomy. PROCEDURE:  Primary low transverse  section. ASSISTANT SURGEON:  Sandrine Arechiga MD.    ANESTHESIA:  Spinal.    FINDINGS:  Adhesion of the bladder to the mid to upper uterus. Remaining fibroids also present as stated in the dictation. A live-born male infant, cephalic; Apgars were 7 at one minute and 9 at five minutes; 6 pounds 7 ounces; nuchal cord x1. OPERATIVE TECHNIQUE:  The patient was taken to the operating room. After spinal was placed, was placed in a supine position. Abdomen was prepped and draped in the usual sterile manner for an abdominal procedure. A Pfannenstiel skin incision was made and carried down sharply to the level of the fascia. Fascia was incised in the midline. This was extended bilaterally. At this point muscles were  and the peritoneum was entered high, extended superiorly and inferiorly keeping the bowel and bladder out of harm's way. There was noted to be a thin but high adhesion of the bladder to the mid to upper uterus. This was taken off the upper uterus with Metzenbaum scissors. At this point, a bladder retractor was placed, able to take the bladder down. There were noted to be very large vessels in the lower uterine segment and a palpable fibroid in the lower uterine segment slightly off to the patient's right with a slight right-sided rotation of the uterus.   At this point, a small incision was made and extended bluntly above this fibroid. At this point, the head was high, most likely secondary to the fibroid, and with fundal pressure from outside the abdomen, the head was brought into the field of the incision. A vacuum was applied and pulled until the head was almost through the incision. The vacuum did pop off once but then with just gentle pressure the head then delivered spontaneously. Nares and mouth were suctioned. There was a very tight nuchal cord x1, which was reducible. The rest of the infant delivered easily, a vigorous live-born male infant. After delayed cord clamping, cord was clamped and cut and the baby was handed to the waiting neonatologist.  The placenta was manually removed. The lining was explored and was clean. The uterus was removed from the abdomen. There was quite obviously this larger fibroid in the right lower uterine segment. She had a pedunculated fibroid off to the left on the side higher in the uterus, approximately 2 to 3 cm. At this point, she had a slightly enlarged, more boggy uterus. The uterus was closed in 2 layers, the first layer being 0 Vicryl in a running locked fashion, the second layer being 0 Vicryl in a running imbricating-type fashion. Two or 3 additional [id number] were placed. Cautery of the serosal edge near the bladder was done. Uterus was placed back in the abdomen. Irrigation was performed. Re-inspection showed excellent hemostasis in the lower uterine segment. The peritoneum was then closed with 2-0 Vicryl in a running type fashion. Subfascial hemostasis was achieved, and the fascia was closed with 0 Vicryl in a running-type fashion. Interrupted sutures of plain were placed in the subcutaneous tissue, and the skin was closed with subcuticular closure of 4-0 undyed Vicryl. Quantitative blood loss was 415 mL. Complications:  None. The patient was transferred to the recovery room in stable condition.   All sponge, needle, and instrument counts were correct.     Dictated By Delvis Hare M.D.  d: 04/18/2022 10:47:20  t: 04/18/2022 13:50:46  [place] [id number]  JRJ/

## 2022-04-18 NOTE — ANESTHESIA POSTPROCEDURE EVALUATION
[facility] Patient Status:  Inpatient   Age/Gender 49year old female MRN [id number]   Location [facility] Attending Mony Quarles MD   [facility] Day # 0 PCP Iván Rizzo       Anesthesia Post-op Note     SECTION    Procedure Summary     Date: 22 Room / Location: [facility] L+D OR 03 / [facility] L+D OR    Anesthesia Start:  Anesthesia Stop:     Procedure:  SECTION (N/A ) Diagnosis:     Surgeons: Mony Quarles MD Anesthesiologist: Kirsten Langford MD    Anesthesia Type: spinal ASA Status: 3          Anesthesia Type: spinal    Vitals Value Taken Time   Blood pressure 113/75 22 1032   TEMP 97.6 22 1032   Pulse 88 22 1032   Resp 20 22 1032   SpO2 97 22 1032       Patient Location: Labor and Delivery    Anesthesia Type: spinal    Airway Patency: patent    Postop Pain Control: adequate    Mental Status: preanesthetic baseline    Nausea/Vomiting: none    Cardiopulmonary/Hydration status: stable euvolemic    Complications: no apparent anesthesia related complications    Postop vital signs: stable    Dental Exam: Unchanged from Preop    Patient to be discharged from PACU when criteria met.

## 2022-04-18 NOTE — PLAN OF CARE
Problem: Patient/Family Goals  Goal: Patient/Family Long Term Goal  Description: Patient's Long Term Goal: Uncomplicated  section    Interventions:    VS per protocol  EFM per protocol  I&O  Valera catheter  Abd prep with clippers as needed  SCD to bilateral lower extremities  NPO  Insert/maintain IV access  Antibiotics per protocol  Informed consent     - See additional Care Plan goals for specific interventions  Outcome: Progressing  Goal: Patient/Family Short Term Goal  Description: Patient's Short Term Goal: Adequate pain control with delivery of infant  Interventions:  Pain assessment scores as ordered  Patient scores pain a \"3\" or less  Multidisciplinary care   Nonpharmacologic comfort measures     - See additional Care Plan goals for specific interventions  Outcome: Progressing     Problem: BIRTH - VAGINAL/ SECTION  Goal: Fetal and maternal status remain reassuring during the birth process  Description: INTERVENTIONS:  - Monitor vital signs  - Monitor fetal heart rate  - Monitor uterine activity  - Monitor labor progression (vaginal delivery)  - DVT prophylaxis (C/S delivery)  - Surgical antibiotic prophylaxis (C/S delivery)  Outcome: Progressing     Problem: PAIN - ADULT  Goal: Verbalizes/displays adequate comfort level or patient's stated pain goal  Description: INTERVENTIONS:  - Encourage pt to monitor pain and request assistance  - Assess pain using appropriate pain scale  - Administer analgesics based on type and severity of pain and evaluate response  - Implement non-pharmacological measures as appropriate and evaluate response  - Consider cultural and social influences on pain and pain management  - Manage/alleviate anxiety  - Utilize distraction and/or relaxation techniques  - Monitor for opioid side effects  - Notify MD/LIP if interventions unsuccessful or patient reports new pain  - Anticipate increased pain with activity and pre-medicate as appropriate  Outcome: Progressing Problem: ANXIETY  Goal: Will report anxiety at manageable levels  Description: INTERVENTIONS:  - Administer medication as ordered  - Teach and rehearse alternative coping skills  - Provide emotional support with 1:1 interaction with staff  Outcome: Progressing

## 2022-04-18 NOTE — BRIEF OP NOTE
Pre-Operative Diagnosis: 40 w1d IUP - previous myomectomy     Post-Operative Diagnosis: same     Procedure Performed: primary LTCS   SECTION    Surgeon(s) and Role:     Robert Ventura MD - Primary     * Russell Sanchez MD - Assisting Surgeon    Assistant(s):        Surgical Findings: Adhesion of the bladder to the mid to upper uterus, remaining fibroids - 1 in the MAGAN- large - about 4 cm - smaller pedunculated fibroid in the left upper uterus -     Liveborn male - cephalic - tight nuchal cord X 1 - Apg 7 and 9, 6#7     Specimen: none     Estimated Blood Loss: QBL - 415CC    Dictation Number:  [id number]    Jaimee Pulido MD  2022  10:42 AM

## 2022-04-18 NOTE — PROGRESS NOTES
Beata Campos was able to sleep some after Zofran. Has had some emesis recently. Given lemon lime soda to sip at, wants to try eating something, given a packet of saltines to try.

## 2022-04-18 NOTE — ANESTHESIA PROCEDURE NOTES
Spinal Block  Performed by: Wade Truong MD  Authorized by: Wade Truong MD       General Information and Staff    Start Time:  4/18/2022 9:28 AM  End Time:  4/18/2022 9:35 AM  Anesthesiologist:  Wade Truong MD  Performed by: Anesthesiologist  Site identification: surface landmarks    Preanesthetic Checklist: patient identified, IV checked, risks and benefits discussed, monitors and equipment checked, pre-op evaluation, timeout performed, anesthesia consent and sterile technique used      Procedure Details    Patient Position:  Sitting  Prep: ChloraPrep    Monitoring:  Cardiac monitor, heart rate and continuous pulse ox  Approach:  Midline  Location:  L3-4  Injection Technique:  Single-shot    Needle    Needle Type:  Sprotte  Needle Gauge:  24 G  Needle Length:  3.5 in    Assessment    Sensory Level:   Events: clear CSF, CSF aspirated, well tolerated and blood negative     Additional Comments     Tolerated spinal well. Single level. Aspirated CSF at beginning and end of local injection.

## 2022-04-18 NOTE — PROGRESS NOTES
NURSING ADMISSION NOTE      Patient admitted via cart. Oriented to room. Safety precautions initiated. Bed in low position. Call light in reach. IV infusing on pump, anglin to gravity, SCDS being applied.

## 2022-04-19 LAB
BASOPHILS # BLD AUTO: 0.03 X10(3) UL (ref 0–0.2)
BASOPHILS NFR BLD AUTO: 0.2 %
EOSINOPHIL # BLD AUTO: 0.1 X10(3) UL (ref 0–0.7)
EOSINOPHIL NFR BLD AUTO: 0.8 %
ERYTHROCYTE [DISTWIDTH] IN BLOOD BY AUTOMATED COUNT: 14.3 %
HCT VFR BLD AUTO: 36.7 %
HGB BLD-MCNC: 11.9 G/DL
IMM GRANULOCYTES # BLD AUTO: 0.09 X10(3) UL (ref 0–1)
IMM GRANULOCYTES NFR BLD: 0.7 %
LYMPHOCYTES # BLD AUTO: 2.55 X10(3) UL (ref 1–4)
LYMPHOCYTES NFR BLD AUTO: 19.7 %
MCH RBC QN AUTO: 29.9 PG (ref 26–34)
MCHC RBC AUTO-ENTMCNC: 32.4 G/DL (ref 31–37)
MCV RBC AUTO: 92.2 FL
MONOCYTES # BLD AUTO: 1.36 X10(3) UL (ref 0.1–1)
MONOCYTES NFR BLD AUTO: 10.5 %
NEUTROPHILS # BLD AUTO: 8.84 X10 (3) UL (ref 1.5–7.7)
NEUTROPHILS # BLD AUTO: 8.84 X10(3) UL (ref 1.5–7.7)
NEUTROPHILS NFR BLD AUTO: 68.1 %
PLATELET # BLD AUTO: 215 10(3)UL (ref 150–450)
RBC # BLD AUTO: 3.98 X10(6)UL
WBC # BLD AUTO: 13 X10(3) UL (ref 4–11)

## 2022-04-19 PROCEDURE — 85025 COMPLETE CBC W/AUTO DIFF WBC: CPT | Performed by: OBSTETRICS & GYNECOLOGY

## 2022-04-19 NOTE — PROGRESS NOTES
04/18/22 1600   Provider Notification   Reason for Communication Other (comment)  (CBC result)   Provider Name Mak Kay MD   Method of Communication Call   Response No new orders   Notification Time [phone]

## 2022-04-19 NOTE — PROGRESS NOTES
04/18/22 2150   Provider Notification   Reason for Communication Other (comment)  (low BP after 500cc LR bolus)   Provider Name Imelda Gonzales MD   Method of Communication Call   Response See orders   Notification Time 2149       MD ordered CBC stat. No new orders with regards to patient's BP. Was advised to call MD for CBC result.

## 2022-04-19 NOTE — PROGRESS NOTES
04/18/22 1949   Provider Notification   Reason for Communication Other (comment)  (low BP)   Provider Name Yas Rizzo MD   Method of Communication Call   Response Waiting for response   Notification Time 1949 1957: Dr Saint Rubenstein called back and ordered for 500cc LR bolus. Order carried out.

## 2022-04-19 NOTE — PLAN OF CARE
Problem: SAFETY ADULT - FALL  Goal: Free from fall injury  Description: INTERVENTIONS:  - Assess pt frequently for physical needs  - Identify cognitive and physical deficits and behaviors that affect risk of falls. - [place] fall precautions as indicated by assessment.  - Educate pt/family on patient safety including physical limitations  - Instruct pt to call for assistance with activity based on assessment  - Modify environment to reduce risk of injury  - Provide assistive devices as appropriate  - Consider OT/PT consult to assist with strengthening/mobility  - Encourage toileting schedule  Outcome: Progressing     Problem: POSTPARTUM  Goal: Long Term Goal:Experiences normal postpartum course  Description: INTERVENTIONS:  - Assess and monitor vital signs and lab values. - Assess fundus and lochia. - Provide ice/sitz baths for perineum discomfort. - Monitor healing of incision/episiotomy/laceration, and assess for signs and symptoms of infection and hematoma. - Assess bladder function and monitor for bladder distention.  - Provide/instruct/assist with pericare as needed. - Provide VTE prophylaxis as needed. - Monitor bowel function.  - Encourage ambulation and provide assistance as needed. - Assess and monitor emotional status and provide social service/psych resources as needed. - Utilize standard precautions and use personal protective equipment as indicated. Ensure aseptic care of all intravenous lines and invasive tubes/drains.  - Obtain immunization and exposure to communicable diseases history. Outcome: Progressing  Goal: Optimize infant feeding at the breast  Description: INTERVENTIONS:  - Initiate breast feeding within first hour after birth. - Monitor effectiveness of current breast feeding efforts. - Assess support systems available to mother/family.  - Identify cultural beliefs/practices regarding lactation, letdown techniques, maternal food preferences.   - Assess mother's knowledge and previous experience with breast feeding.  - Provide information as needed about early infant feeding cues (e.g., rooting, lip smacking, sucking fingers/hand) versus late cue of crying.  - Discuss/demonstrate breast feeding aids (e.g., infant sling, nursing footstool/pillows, and breast pumps). - Encourage mother/other family members to express feelings/concerns, and actively listen. - Educate father/SO about benefits of breast feeding and how to manage common lactation challenges. - Recommend avoidance of specific medications or substances incompatible with breast feeding.  - Assess and monitor for signs of nipple pain/trauma. - Instruct and provide assistance with proper latch. - Review techniques for milk expression (breast pumping) and storage of breast milk. Provide pumping equipment/supplies, instructions and assistance, as needed. - Encourage rooming-in and breast feeding on demand.  - Encourage skin-to-skin contact. - Provide LC support as needed. - Assess for and manage engorgement. - Provide breast feeding education handouts and information on community breast feeding support. Outcome: Progressing  Goal: Appropriate maternal -  bonding  Description: INTERVENTIONS:  - Assess caregiver- interactions. - Assess caregiver's emotional status and coping mechanisms. - Encourage caregiver to participate in  daily care. - Assess support systems available to mother/family.  - Provide /case management support as needed.   Outcome: Progressing     Problem: GASTROINTESTINAL - ADULT  Goal: Minimal or absence of nausea and vomiting  Description: INTERVENTIONS:  - Maintain adequate hydration with IV or PO as ordered and tolerated  - Nasogastric tube to low intermittent suction as ordered  - Evaluate effectiveness of ordered antiemetic medications  - Provide nonpharmacologic comfort measures as appropriate  - Advance diet as tolerated, if ordered  - Obtain nutritional consult as needed  - Evaluate fluid balance  Outcome: Progressing  Goal: Maintains or returns to baseline bowel function  Description: INTERVENTIONS:  - Assess bowel function  - Maintain adequate hydration with IV or PO as ordered and tolerated  - Evaluate effectiveness of GI medications  - Encourage mobilization and activity  - Obtain nutritional consult as needed  - Establish a toileting routine/schedule  - Consider collaborating with pharmacy to review patient's medication profile  Outcome: Progressing  Goal: Maintains adequate nutritional intake (undernourished)  Description: INTERVENTIONS:  - Monitor percentage of each meal consumed  - Identify factors contributing to decreased intake, treat as appropriate  - Assist with meals as needed  - Monitor I&O, WT and lab values  - Obtain nutritional consult as needed  - Optimize oral hygiene and moisture  - Encourage food from home; allow for food preferences  - Enhance eating environment  Outcome: Progressing  Goal: Achieves appropriate nutritional intake (bariatric)  Description: INTERVENTIONS:  - Monitor for over-consumption  - Identify factors contributing to increased intake, treat as appropriate  - Monitor I&O, WT and lab values  - Obtain nutritional consult as needed  - Evaluate psychosocial factors contributing to over-consumption  Outcome: Progressing

## 2022-04-19 NOTE — CM/SW NOTE
04/19/22 1400   Referral Data   Referral Source Nurse   Referral Reason Counseling/support;Psychosocial assessment     SW received order to f/u with pt to complete assessment and provide resources as needed. Per RN and chart review, pt's pregnancy was IVF with donor egg. Pt presented with a cheerful affect. Pt states her mother who is in her 66's resides with her, and is supportive. Pt states her mother plans to assist with babysitting, however she feels she needs a back up person to be able to assist with baby since her mother has some health concerns. SW to provide resources for . Pt's sister in law was also present, and agreed to help with baby. SW discussed coping skills including rest and self care when able. Pt states she has maternity leave to be able to work on finding childcare, etc. Ana Rosa Montoya also encouraged pt to network with other mother's and support groups in the community. SW provided resources for support groups, and written signs and symptoms of Postpartum Depression/anxiety with [facility] resources for psychiatrist and counselors.      [facility]

## 2022-04-20 NOTE — PLAN OF CARE
Problem: SAFETY ADULT - FALL  Goal: Free from fall injury  Description: INTERVENTIONS:  - Assess pt frequently for physical needs  - Identify cognitive and physical deficits and behaviors that affect risk of falls. - [place] fall precautions as indicated by assessment.  - Educate pt/family on patient safety including physical limitations  - Instruct pt to call for assistance with activity based on assessment  - Modify environment to reduce risk of injury  - Provide assistive devices as appropriate  - Consider OT/PT consult to assist with strengthening/mobility  - Encourage toileting schedule  Outcome: Progressing     Problem: POSTPARTUM  Goal: Long Term Goal:Experiences normal postpartum course  Description: INTERVENTIONS:  - Assess and monitor vital signs and lab values. - Assess fundus and lochia. - Provide ice/sitz baths for perineum discomfort. - Monitor healing of incision/episiotomy/laceration, and assess for signs and symptoms of infection and hematoma. - Assess bladder function and monitor for bladder distention.  - Provide/instruct/assist with pericare as needed. - Provide VTE prophylaxis as needed. - Monitor bowel function.  - Encourage ambulation and provide assistance as needed. - Assess and monitor emotional status and provide social service/psych resources as needed. - Utilize standard precautions and use personal protective equipment as indicated. Ensure aseptic care of all intravenous lines and invasive tubes/drains.  - Obtain immunization and exposure to communicable diseases history. Outcome: Progressing  Goal: Optimize infant feeding at the breast  Description: INTERVENTIONS:  - Initiate breast feeding within first hour after birth. - Monitor effectiveness of current breast feeding efforts. - Assess support systems available to mother/family.  - Identify cultural beliefs/practices regarding lactation, letdown techniques, maternal food preferences.   - Assess mother's knowledge and previous experience with breast feeding.  - Provide information as needed about early infant feeding cues (e.g., rooting, lip smacking, sucking fingers/hand) versus late cue of crying.  - Discuss/demonstrate breast feeding aids (e.g., infant sling, nursing footstool/pillows, and breast pumps). - Encourage mother/other family members to express feelings/concerns, and actively listen. - Educate father/SO about benefits of breast feeding and how to manage common lactation challenges. - Recommend avoidance of specific medications or substances incompatible with breast feeding.  - Assess and monitor for signs of nipple pain/trauma. - Instruct and provide assistance with proper latch. - Review techniques for milk expression (breast pumping) and storage of breast milk. Provide pumping equipment/supplies, instructions and assistance, as needed. - Encourage rooming-in and breast feeding on demand.  - Encourage skin-to-skin contact. - Provide LC support as needed. - Assess for and manage engorgement. - Provide breast feeding education handouts and information on community breast feeding support. Outcome: Progressing  Goal: Appropriate maternal -  bonding  Description: INTERVENTIONS:  - Assess caregiver- interactions. - Assess caregiver's emotional status and coping mechanisms. - Encourage caregiver to participate in  daily care. - Assess support systems available to mother/family.  - Provide /case management support as needed.   Outcome: Progressing     Problem: GASTROINTESTINAL - ADULT  Goal: Minimal or absence of nausea and vomiting  Description: INTERVENTIONS:  - Maintain adequate hydration with IV or PO as ordered and tolerated  - Nasogastric tube to low intermittent suction as ordered  - Evaluate effectiveness of ordered antiemetic medications  - Provide nonpharmacologic comfort measures as appropriate  - Advance diet as tolerated, if ordered  - Obtain nutritional consult as needed  - Evaluate fluid balance  Outcome: Progressing  Goal: Maintains or returns to baseline bowel function  Description: INTERVENTIONS:  - Assess bowel function  - Maintain adequate hydration with IV or PO as ordered and tolerated  - Evaluate effectiveness of GI medications  - Encourage mobilization and activity  - Obtain nutritional consult as needed  - Establish a toileting routine/schedule  - Consider collaborating with pharmacy to review patient's medication profile  Outcome: Progressing  Goal: Maintains adequate nutritional intake (undernourished)  Description: INTERVENTIONS:  - Monitor percentage of each meal consumed  - Identify factors contributing to decreased intake, treat as appropriate  - Assist with meals as needed  - Monitor I&O, WT and lab values  - Obtain nutritional consult as needed  - Optimize oral hygiene and moisture  - Encourage food from home; allow for food preferences  - Enhance eating environment  Outcome: Progressing  Goal: Achieves appropriate nutritional intake (bariatric)  Description: INTERVENTIONS:  - Monitor for over-consumption  - Identify factors contributing to increased intake, treat as appropriate  - Monitor I&O, WT and lab values  - Obtain nutritional consult as needed  - Evaluate psychosocial factors contributing to over-consumption  Outcome: Progressing

## 2022-04-21 VITALS
DIASTOLIC BLOOD PRESSURE: 72 MMHG | TEMPERATURE: 98 F | OXYGEN SATURATION: 97 % | RESPIRATION RATE: 17 BRPM | SYSTOLIC BLOOD PRESSURE: 98 MMHG | BODY MASS INDEX: 31.12 KG/M2 | HEIGHT: 59 IN | HEART RATE: 74 BPM | WEIGHT: 154.38 LBS

## 2022-04-21 RX ORDER — IBUPROFEN 600 MG/1
600 TABLET ORAL EVERY 6 HOURS PRN
Qty: 30 TABLET | Refills: 0 | Status: SHIPPED | OUTPATIENT
Start: 2022-04-21

## 2022-04-21 RX ORDER — HYDROCODONE BITARTRATE AND ACETAMINOPHEN 5; 325 MG/1; MG/1
1 TABLET ORAL EVERY 6 HOURS PRN
Qty: 10 TABLET | Refills: 0 | Status: SHIPPED | OUTPATIENT
Start: 2022-04-21

## 2022-04-21 RX ORDER — ONDANSETRON 4 MG/1
4 TABLET, ORALLY DISINTEGRATING ORAL EVERY 8 HOURS PRN
Qty: 20 TABLET | Refills: 0 | Status: SHIPPED | OUTPATIENT
Start: 2022-04-21

## 2022-04-21 NOTE — PLAN OF CARE
Up, ambulated in [place], beginning to get constipated, MOM given  Cecilio Newberry had some questions, teaching reinforced, she also watched teaching videos      Problem: POSTPARTUM  Goal: Long Term Goal:Experiences normal postpartum course  Description: INTERVENTIONS:  - Assess and monitor vital signs and lab values. - Assess fundus and lochia.  - Monitor healing of incision, assess for signs and symptoms of infection and hematoma. - Assess bladder function and monitor for bladder distention.  - Provide/instruct/assist with pericare as needed. - Provide VTE prophylaxis as needed. - Monitor bowel function.  - Encourage ambulation and provide assistance as needed. - Assess and monitor emotional status and provide social service/psych resources as needed. - Utilize standard precautions and use personal protective equipment as indicated. Ensure aseptic care of all intravenous lines and invasive tubes/drains.  - Obtain immunization and exposure to communicable diseases history.   Outcome: Progressing steady

## 2022-04-21 NOTE — DISCHARGE SUMMARY
[facility]  OB Discharge Summary    Jacqui Silveira Patient Status:  Inpatient    1970 MRN [id number]   [facility] 2SW-J Attending Aniceto Jeans, MD   Hosp Day # 3 PCP Mitra Solomon     Date of Admission: 2022  Date of Discharge: 2022     Admission Diagnosis:  IUP @ 42w4d  H/o myomectomy  AMA/IVF  GDMA1  Hypothyroid      Discharge Diagnosis:  Same as above with delivery of a liveborn male infant    Procedure: PLTCS  Date of Delivery: Information for the patient's : David Nice [[id number]]   2022    Brief Hospital Course:  Jacqui Silveira is a 49year old admitted for scheduled  due to h/o myomectomy at 37w1d. Pt underwent PLTCS on 22. Her hospital course was uncomplicated and she was discharged home on post-op day 3 doing well. She was ambulating, voiding without difficulty and pain was controlled on PO pain medicine. Discharge Instructions: The patient was instructed to call the office if temperature greater than 100.4, intractible pain/nausea/vomiting or if vaginal bleeding greater than 1 pad per hour or foul odor/discahrge from incision site.     Discharge Meds:  The patient was discharged home with motrin and norco    Follow-up: 6 weeks

## 2022-04-21 NOTE — PLAN OF CARE
Problem: SAFETY ADULT - FALL  Goal: Free from fall injury  Description: INTERVENTIONS:  - Assess pt frequently for physical needs  - Identify cognitive and physical deficits and behaviors that affect risk of falls. - [place] fall precautions as indicated by assessment.  - Educate pt/family on patient safety including physical limitations  - Instruct pt to call for assistance with activity based on assessment  - Modify environment to reduce risk of injury  - Provide assistive devices as appropriate  - Consider OT/PT consult to assist with strengthening/mobility  - Encourage toileting schedule  Outcome: Completed     Problem: POSTPARTUM  Goal: Long Term Goal:Experiences normal postpartum course  Description: INTERVENTIONS:  - Assess and monitor vital signs and lab values. - Assess fundus and lochia. - Provide ice/sitz baths for perineum discomfort. - Monitor healing of incision/episiotomy/laceration, and assess for signs and symptoms of infection and hematoma. - Assess bladder function and monitor for bladder distention.  - Provide/instruct/assist with pericare as needed. - Provide VTE prophylaxis as needed. - Monitor bowel function.  - Encourage ambulation and provide assistance as needed. - Assess and monitor emotional status and provide social service/psych resources as needed. - Utilize standard precautions and use personal protective equipment as indicated. Ensure aseptic care of all intravenous lines and invasive tubes/drains.  - Obtain immunization and exposure to communicable diseases history. Outcome: Completed  Goal: Optimize infant feeding at the breast  Description: INTERVENTIONS:  - Initiate breast feeding within first hour after birth. - Monitor effectiveness of current breast feeding efforts. - Assess support systems available to mother/family.  - Identify cultural beliefs/practices regarding lactation, letdown techniques, maternal food preferences.   - Assess mother's knowledge and previous experience with breast feeding.  - Provide information as needed about early infant feeding cues (e.g., rooting, lip smacking, sucking fingers/hand) versus late cue of crying.  - Discuss/demonstrate breast feeding aids (e.g., infant sling, nursing footstool/pillows, and breast pumps). - Encourage mother/other family members to express feelings/concerns, and actively listen. - Educate father/SO about benefits of breast feeding and how to manage common lactation challenges. - Recommend avoidance of specific medications or substances incompatible with breast feeding.  - Assess and monitor for signs of nipple pain/trauma. - Instruct and provide assistance with proper latch. - Review techniques for milk expression (breast pumping) and storage of breast milk. Provide pumping equipment/supplies, instructions and assistance, as needed. - Encourage rooming-in and breast feeding on demand.  - Encourage skin-to-skin contact. - Provide LC support as needed. - Assess for and manage engorgement. - Provide breast feeding education handouts and information on community breast feeding support. Outcome: Completed  Goal: Appropriate maternal -  bonding  Description: INTERVENTIONS:  - Assess caregiver- interactions. - Assess caregiver's emotional status and coping mechanisms. - Encourage caregiver to participate in  daily care. - Assess support systems available to mother/family.  - Provide /case management support as needed.   Outcome: Completed     Problem: GASTROINTESTINAL - ADULT  Goal: Minimal or absence of nausea and vomiting  Description: INTERVENTIONS:  - Maintain adequate hydration with IV or PO as ordered and tolerated  - Nasogastric tube to low intermittent suction as ordered  - Evaluate effectiveness of ordered antiemetic medications  - Provide nonpharmacologic comfort measures as appropriate  - Advance diet as tolerated, if ordered  - Obtain nutritional consult as needed  - Evaluate fluid balance  Outcome: Completed  Goal: Maintains or returns to baseline bowel function  Description: INTERVENTIONS:  - Assess bowel function  - Maintain adequate hydration with IV or PO as ordered and tolerated  - Evaluate effectiveness of GI medications  - Encourage mobilization and activity  - Obtain nutritional consult as needed  - Establish a toileting routine/schedule  - Consider collaborating with pharmacy to review patient's medication profile  Outcome: Completed  Goal: Maintains adequate nutritional intake (undernourished)  Description: INTERVENTIONS:  - Monitor percentage of each meal consumed  - Identify factors contributing to decreased intake, treat as appropriate  - Assist with meals as needed  - Monitor I&O, WT and lab values  - Obtain nutritional consult as needed  - Optimize oral hygiene and moisture  - Encourage food from home; allow for food preferences  - Enhance eating environment  Outcome: Completed

## 2022-04-23 ENCOUNTER — TELEPHONE (OUTPATIENT)
Dept: OBGYN UNIT | Facility: HOSPITAL | Age: 52
End: 2022-04-23

## 2022-04-23 NOTE — PROGRESS NOTES
Reviewed self and infant care w / mom, she verbalizes understanding of instructions reviewed. Encourage to follow up w/ MDs as directed and w/ questions/concerns. SPK, IVF, grandma in to help, enc to join ct.

## 2022-04-28 ENCOUNTER — NURSE ONLY (OUTPATIENT)
Dept: LACTATION | Facility: HOSPITAL | Age: 52
End: 2022-04-28
Attending: OBSTETRICS & GYNECOLOGY
Payer: COMMERCIAL

## 2022-04-28 VITALS — TEMPERATURE: 97 F

## 2022-04-28 PROCEDURE — 99212 OFFICE O/P EST SF 10 MIN: CPT

## 2023-09-27 ENCOUNTER — HOSPITAL ENCOUNTER (EMERGENCY)
Facility: HOSPITAL | Age: 53
Discharge: HOME | End: 2023-09-27
Attending: EMERGENCY MEDICINE
Payer: COMMERCIAL

## 2023-09-27 ENCOUNTER — TELEPHONE (OUTPATIENT)
Dept: NEUROLOGY | Facility: CLINIC | Age: 53
End: 2023-09-27

## 2023-09-27 VITALS
DIASTOLIC BLOOD PRESSURE: 97 MMHG | WEIGHT: 150 LBS | BODY MASS INDEX: 30 KG/M2 | HEART RATE: 75 BPM | SYSTOLIC BLOOD PRESSURE: 163 MMHG | TEMPERATURE: 98 F | OXYGEN SATURATION: 100 % | RESPIRATION RATE: 16 BRPM

## 2023-09-27 DIAGNOSIS — R53.1 WEAKNESS GENERALIZED: Primary | ICD-10-CM

## 2023-09-27 DIAGNOSIS — G45.9 TIA (TRANSIENT ISCHEMIC ATTACK): ICD-10-CM

## 2023-09-27 DIAGNOSIS — R20.2 PARESTHESIAS: ICD-10-CM

## 2023-09-27 DIAGNOSIS — H53.9 VISUAL DISTURBANCE: ICD-10-CM

## 2023-09-27 LAB
ATRIAL RATE: 75 BPM
FLUAV + FLUBV RNA SPEC NAA+PROBE: NEGATIVE
FLUAV + FLUBV RNA SPEC NAA+PROBE: NEGATIVE
P AXIS: 36 DEGREES
P-R INTERVAL: 140 MS
Q-T INTERVAL: 386 MS
QRS DURATION: 72 MS
QTC CALCULATION (BEZET): 431 MS
R AXIS: 8 DEGREES
RSV RNA SPEC NAA+PROBE: NEGATIVE
SARS-COV-2 RNA RESP QL NAA+PROBE: NOT DETECTED
T AXIS: 12 DEGREES
VENTRICULAR RATE: 75 BPM

## 2023-09-27 PROCEDURE — 93010 ELECTROCARDIOGRAM REPORT: CPT

## 2023-09-27 PROCEDURE — 93005 ELECTROCARDIOGRAM TRACING: CPT

## 2023-09-27 PROCEDURE — 99284 EMERGENCY DEPT VISIT MOD MDM: CPT

## 2023-09-27 PROCEDURE — 99285 EMERGENCY DEPT VISIT HI MDM: CPT

## 2023-09-27 PROCEDURE — 0241U SARS-COV-2/FLU A AND B/RSV BY PCR (GENEXPERT): CPT | Performed by: EMERGENCY MEDICINE

## 2023-09-27 PROCEDURE — S0119 ONDANSETRON 4 MG: HCPCS | Performed by: EMERGENCY MEDICINE

## 2023-09-27 RX ORDER — ONDANSETRON 4 MG/1
4 TABLET, ORALLY DISINTEGRATING ORAL ONCE
Status: COMPLETED | OUTPATIENT
Start: 2023-09-27 | End: 2023-09-27

## 2023-09-27 NOTE — TELEPHONE ENCOUNTER
TIA CLINIC SCREENING    1. Date of ED visit/TIA diagnosis:  9/27/2023   Time of discharge from ED:  0551    2. Is patient currently admitted? No   If YES - [facility] Appointment not required. 3. Does patient already see an [facility] neurologist?  No  Name:     (if YES - [facility] Appointment not required. Route message on to patient's neurologist)    4. Patient's current anti-platelet therapy:  NONE    5. Patient's current statin therapy:      6. Has 2D Echo with bubble test been done? No  Date:      7. Is [facility] Clinic Appointment indicated? Unsure     If YES - route encounter to [address] [place] [address] to schedule patient for appointment NO LATER THAN 48 HOURS AFTER ED DISCHARGE. If UNSURE - route encounter to clinic provider for recommendation.      If NO - indicate reason and close encounter:  N/A

## 2023-09-27 NOTE — ED INITIAL ASSESSMENT (HPI)
Pt arrives to ed w c/o bilateral numbness, vision changes, and weakness causing her to collapse. Pt reports symptoms since last Thursday. Pt reports vomiting the past couple of days but no episodes today.

## 2023-09-28 ENCOUNTER — TELEPHONE (OUTPATIENT)
Dept: INTERNAL MEDICINE CLINIC | Facility: CLINIC | Age: 53
End: 2023-09-28

## 2023-09-28 ENCOUNTER — OFFICE VISIT (OUTPATIENT)
Dept: NEUROLOGY | Facility: CLINIC | Age: 53
End: 2023-09-28
Payer: COMMERCIAL

## 2023-09-28 ENCOUNTER — HOSPITAL ENCOUNTER (OUTPATIENT)
Dept: MRI IMAGING | Facility: HOSPITAL | Age: 53
Discharge: HOME | End: 2023-09-28
Attending: Other
Payer: COMMERCIAL

## 2023-09-28 ENCOUNTER — TELEPHONE (OUTPATIENT)
Dept: SURGERY | Facility: CLINIC | Age: 53
End: 2023-09-28

## 2023-09-28 VITALS
HEART RATE: 80 BPM | SYSTOLIC BLOOD PRESSURE: 112 MMHG | DIASTOLIC BLOOD PRESSURE: 74 MMHG | RESPIRATION RATE: 18 BRPM | WEIGHT: 157.19 LBS | BODY MASS INDEX: 32 KG/M2

## 2023-09-28 DIAGNOSIS — R20.0 NUMBNESS: Primary | ICD-10-CM

## 2023-09-28 DIAGNOSIS — R20.2 NUMBNESS AND TINGLING OF LEFT ARM AND LEG: ICD-10-CM

## 2023-09-28 DIAGNOSIS — G43.109 MIGRAINE WITH AURA AND WITHOUT STATUS MIGRAINOSUS, NOT INTRACTABLE: ICD-10-CM

## 2023-09-28 DIAGNOSIS — H53.9 VISUAL DISTURBANCE: ICD-10-CM

## 2023-09-28 DIAGNOSIS — R29.818 TRANSIENT NEUROLOGICAL SYMPTOMS: ICD-10-CM

## 2023-09-28 DIAGNOSIS — I74.9 EMBOLIC INFARCTION (HCC): Primary | ICD-10-CM

## 2023-09-28 DIAGNOSIS — R20.0 NUMBNESS AND TINGLING OF LEFT ARM AND LEG: ICD-10-CM

## 2023-09-28 DIAGNOSIS — R20.0 NUMBNESS: ICD-10-CM

## 2023-09-28 PROCEDURE — A9575 INJ GADOTERATE MEGLUMI 0.1ML: HCPCS | Performed by: OTHER

## 2023-09-28 PROCEDURE — 99204 OFFICE O/P NEW MOD 45 MIN: CPT | Performed by: OTHER

## 2023-09-28 PROCEDURE — 3078F DIAST BP <80 MM HG: CPT | Performed by: OTHER

## 2023-09-28 PROCEDURE — 3074F SYST BP LT 130 MM HG: CPT | Performed by: OTHER

## 2023-09-28 PROCEDURE — 70553 MRI BRAIN STEM W/O & W/DYE: CPT | Performed by: OTHER

## 2023-09-28 RX ORDER — LORAZEPAM 0.5 MG/1
0.5 TABLET ORAL ONCE
Qty: 1 TABLET | Refills: 0 | Status: SHIPPED | OUTPATIENT
Start: 2023-09-28 | End: 2023-09-28

## 2023-09-28 RX ORDER — RIZATRIPTAN BENZOATE 10 MG/1
TABLET ORAL
Qty: 12 TABLET | Refills: 0 | Status: SHIPPED | OUTPATIENT
Start: 2023-09-28

## 2023-09-28 RX ORDER — ONDANSETRON 4 MG/1
4 TABLET, ORALLY DISINTEGRATING ORAL EVERY 8 HOURS PRN
Qty: 30 TABLET | Refills: 2 | Status: SHIPPED | OUTPATIENT
Start: 2023-09-28

## 2023-09-28 RX ORDER — AMITRIPTYLINE HYDROCHLORIDE 25 MG/1
25 TABLET, FILM COATED ORAL NIGHTLY
Qty: 30 TABLET | Refills: 5 | Status: SHIPPED | OUTPATIENT
Start: 2023-09-28

## 2023-09-28 RX ORDER — GADOTERATE MEGLUMINE 376.9 MG/ML
15 INJECTION INTRAVENOUS
Status: COMPLETED | OUTPATIENT
Start: 2023-09-28 | End: 2023-09-28

## 2023-09-28 RX ADMIN — GADOTERATE MEGLUMINE 14 ML: 376.9 INJECTION INTRAVENOUS at 16:58:00

## 2023-09-28 NOTE — TELEPHONE ENCOUNTER
Pt came back into the office and stated that she called to schedule her MRI, the person she spoke to informed me that the next available for MRI would be 11/2. Pt was also informed that if the code were to be changed to STAT instead, that they can see pt sooner for an MRI.      Pt is concerned bc she stated she shouldn't drive until the MRI results    Please advise
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
## 2023-09-28 NOTE — TELEPHONE ENCOUNTER
Dr Niharika Mai informed that soonest patient can get MRI is 11/2. Verbal order received from Dr Niharika Mai to change MRI Brain w/wo to STAT. STAT MRI Brain order placed.

## 2023-09-29 ENCOUNTER — PATIENT MESSAGE (OUTPATIENT)
Dept: NEUROLOGY | Facility: CLINIC | Age: 53
End: 2023-09-29

## 2023-09-29 NOTE — TELEPHONE ENCOUNTER
Received call from Radiology earlier this evening-  MRI brain shows tiny foci of infarction in right frontal lobe     Spoke with patient and gave MRI results. Advise to get CTA head and neck and ECHO with bubbles for work up. May need some blood work as well    Continue Aspirin full dose. Ok to take Amitriptline as discuss in clinic for migraine prevention.      If any recurrence or new TIA/stroke symptoms go to ED

## 2024-09-23 ENCOUNTER — HOSPITAL ENCOUNTER (OUTPATIENT)
Age: 54
Discharge: HOME | End: 2024-09-23
Payer: COMMERCIAL

## 2024-09-23 VITALS
HEART RATE: 100 BPM | RESPIRATION RATE: 22 BRPM | SYSTOLIC BLOOD PRESSURE: 118 MMHG | TEMPERATURE: 98 F | OXYGEN SATURATION: 99 % | DIASTOLIC BLOOD PRESSURE: 88 MMHG

## 2024-09-23 DIAGNOSIS — U07.1 COVID-19: Primary | ICD-10-CM

## 2024-09-23 LAB — SARS-COV-2 RNA RESP QL NAA+PROBE: DETECTED

## 2024-09-23 PROCEDURE — U0002 COVID-19 LAB TEST NON-CDC: HCPCS | Performed by: NURSE PRACTITIONER

## 2024-09-23 PROCEDURE — 99213 OFFICE O/P EST LOW 20 MIN: CPT | Performed by: NURSE PRACTITIONER

## 2024-09-23 RX ORDER — ATORVASTATIN CALCIUM 40 MG/1
40 TABLET, FILM COATED ORAL DAILY
COMMUNITY

## 2024-09-23 RX ORDER — BACLOFEN 5 MG/1
5 TABLET ORAL 2 TIMES DAILY PRN
COMMUNITY
Start: 2024-07-20

## 2024-09-23 RX ORDER — FENOFIBRATE 145 MG/1
145 TABLET, COATED ORAL DAILY
COMMUNITY

## 2024-09-23 RX ORDER — CLOPIDOGREL BISULFATE 75 MG/1
75 TABLET ORAL DAILY
COMMUNITY

## 2024-09-23 RX ORDER — ASPIRIN 81 MG/1
TABLET ORAL
COMMUNITY

## 2024-09-23 RX ORDER — FOLIC ACID 1 MG/1
1 TABLET ORAL DAILY
COMMUNITY
Start: 2024-01-28

## 2024-09-23 NOTE — ED PROVIDER NOTES
Patient Seen in: [facility]      History     Chief Complaint   Patient presents with    Cough    Fever    Sore Throat    Fatigue     Stated Complaint: coughing fever sore throat fatigue    Subjective:   54-year-old female presents today with complaints of URI symptoms with recent travel.  Son now with similar symptoms.  Feeling generalized fatigue sore throat cough.  Cough is a dry cough denies any shortness of breath.  No other symptoms or concerns.  The patient's medication list, past medical history and social history elements as listed in today's nurse's notes were reviewed and agreed (except as otherwise stated in the HPI).  The patient's family history reviewed and determined to be noncontributory to the presenting problem            Objective:   Past Medical History:    Anemia    BMI 31.0-31.9,adult    Cancer (HCC)    melanoma    Fibroids    Gestational diabetes (HCC)    diet controlled    H/O myomectomy    Hypothyroidism    Melanoma (HCC)    Nausea & vomiting    Other specified hypothyroidism    Stroke (HCC)    Thyroid disease              Past Surgical History:   Procedure Laterality Date    Eye surgery      Other surgical history      2 melanoma's    Other surgical history      fibroid    Skin graft procedure                  Social History     Socioeconomic History    Marital status:    Tobacco Use    Smoking status: Never    Smokeless tobacco: Never   Vaping Use    Vaping status: Never Used   Substance and Sexual Activity    Alcohol use: Not Currently    Drug use: Never   Other Topics Concern    Caffeine Concern No    Exercise No              Review of Systems    Positive for stated Chief Complaint: Cough, Fever, Sore Throat, and Fatigue    Other systems are as noted in HPI.  Constitutional and vital signs reviewed.      All other systems reviewed and negative except as noted above.    Physical Exam     ED Triage Vitals [09/23/24 1839]   /88   Pulse 100   Resp 22   Temp 98.3 °F  (36.8 °C)   Temp src Temporal   SpO2 99 %   O2 Device None (Room air)       Current Vitals:   Vital Signs  BP: 118/88  Pulse: 100  Resp: 22  Temp: 98.3 °F (36.8 °C)  Temp src: Temporal    Oxygen Therapy  SpO2: 99 %  O2 Device: None (Room air)            Physical Exam  Vitals and nursing note reviewed.   Constitutional:       Appearance: She is well-developed.   HENT:      Head: Normocephalic.      Right Ear: Tympanic membrane and ear canal normal.      Left Ear: Tympanic membrane and ear canal normal.      Nose: Mucosal edema, congestion and rhinorrhea present.      Mouth/Throat:      Pharynx: Uvula midline. Posterior oropharyngeal erythema present.   Eyes:      Conjunctiva/sclera: Conjunctivae normal.      Pupils: Pupils are equal, round, and reactive to light.   Cardiovascular:      Rate and Rhythm: Normal rate and regular rhythm.   Pulmonary:      Effort: Pulmonary effort is normal.      Breath sounds: Normal breath sounds.   Musculoskeletal:      Cervical back: Normal range of motion and neck supple.   Lymphadenopathy:      Cervical: No cervical adenopathy.   Skin:     General: Skin is warm and dry.   Neurological:      Mental Status: She is alert and oriented to person, place, and time.               ED Course     Labs Reviewed   RAPID SARS-COV-2 BY PCR - Abnormal; Notable for the following components:       Result Value    Rapid SARS-CoV-2 by PCR Detected (*)     All other components within normal limits                      MDM     Please note that this report has been produced using speech recognition software and may contain errors related to that system including, but not limited to, errors in grammar, punctuation, and spelling, as well as words and phrases that possibly may have been recognized inappropriately.  If there are any questions or concerns, contact the dictating provider for clarification.                                         Medical Decision Making  Differential diagnosis includes but is  not limited to: COVID-19, viral URI, strep throat, influenza, pneumonia, sinusitis, bronchitis      Presented today with flulike symptoms.  COVID-19 testing was done and was positive.  To remain quarantined symptoms started to improve, if anytime develops a fever must be fever free for 24 hours with symptoms improving before leaving quarantine.  Encouraged to push fluids rest.  To take Tylenol for any fever pain.  Take over-the-counter antihistamine cough suppressant as needed.   To follow-up with primary MD 14 days after quarantine ends for reevaluation.  Patient verbalized understanding agree with plan of care.      Amount and/or Complexity of Data Reviewed  Labs: ordered. Decision-making details documented in ED Course.     Details: From COVID-19    Risk  OTC drugs.        Disposition and Plan     Clinical Impression:  1. COVID-19         Disposition:  Discharge  9/23/2024  7:03 pm    Follow-up:  Tere Ramey MD  [address] [facility] [address]  [phone]    In 2 weeks  for recheck          Medications Prescribed:  Current Discharge Medication List

## 2025-01-14 ENCOUNTER — HOSPITAL ENCOUNTER (OUTPATIENT)
Age: 55
Discharge: HOME | End: 2025-01-14
Payer: COMMERCIAL

## 2025-01-14 VITALS
BODY MASS INDEX: 27.48 KG/M2 | TEMPERATURE: 98 F | DIASTOLIC BLOOD PRESSURE: 94 MMHG | WEIGHT: 140 LBS | OXYGEN SATURATION: 100 % | HEART RATE: 90 BPM | RESPIRATION RATE: 16 BRPM | HEIGHT: 60 IN | SYSTOLIC BLOOD PRESSURE: 125 MMHG

## 2025-01-14 DIAGNOSIS — J06.9 VIRAL URI: Primary | ICD-10-CM

## 2025-01-14 LAB
POCT INFLUENZA A: NEGATIVE
POCT INFLUENZA B: NEGATIVE
SARS-COV-2 RNA RESP QL NAA+PROBE: NOT DETECTED

## 2025-01-14 PROCEDURE — U0002 COVID-19 LAB TEST NON-CDC: HCPCS | Performed by: NURSE PRACTITIONER

## 2025-01-14 PROCEDURE — 87502 INFLUENZA DNA AMP PROBE: CPT | Performed by: NURSE PRACTITIONER

## 2025-01-14 PROCEDURE — 99214 OFFICE O/P EST MOD 30 MIN: CPT | Performed by: NURSE PRACTITIONER

## 2025-01-14 NOTE — DISCHARGE INSTRUCTIONS
Name: Radha Recio    : 2024    12:07 AM 2024    10:00 PM 2024     9:45 PM 2024     9:30 PM 2024     9:20 PM 2024     9:19 PM 2024    10:15 AM   Ambulatory Bariatric Summary   Systolic 138 140 159 156 177 177 135   Diastolic 78 70 78 79 86 86 71   Pulse 75     71 82   Temp      97.8 °F (36.6 °C) 97.5 °F (36.4 °C)   Respirations 16     18 18   Weight - Scale      108 109.2   Height       1.651 m (5' 5\")   BMI      0 kg/m2 18.2 kg/m2   Weight - Scale      49 kg (108 lb) 49.5 kg (109 lb 3.2 oz)   BMI (Calculated)      0 18.2       There is no height or weight on file to calculate BMI.    Service Dept: [facility]  [address] [place] [id number]  Dept: [phone]  Dept Fax: [phone]     Patient's Pharmacies:    [facility] [address] [facility] [phone] -  [phone]  [address] [facility] [address]  Phone: [phone] Fax: [phone]    [facility] [address] [place] [address] [phone] - F [phone]  [address] [place], [place] [id number]  Phone: [phone] Fax: [phone]       Chief Complaint   Patient presents with    Knee Pain       HPI:  The patient is here with a chief complaint of left knee pain, throbbing, burning pain.  X-rays in the past have been positive for moderate osteoarthritis and patient continues to have difficulty with it.  Pain is a 7-10.  Patient has previously well-tolerated cortisone injections.     LMP  (LMP Unknown)    No Known Allergies   Current Outpatient Medications   Medication Sig Dispense Refill    losartan (COZAAR) 100 MG tablet Take 1 tablet by mouth once daily 90 tablet 0    FLUoxetine (PROZAC) 20 MG capsule Take 1 capsule by mouth daily 90 capsule 1    megestrol (MEGACE) 20 MG tablet Take 1 tablet by mouth daily       Rest and drink plenty of fluids.    This will help to thin the mucous in the back of your throat.   Take Tylenol and/or ibuprofen as needed for pain or fever.   Use Zyrtec, Claritin, or Allegra to help with nasal drainage.   You can take this twice a day.   Use Flonase nasal spray one spray each nostril twice a day.   Take Robitussin or Delsym as needed for cough.   Follow up with your PCP in 5-7 days.     Your symptoms should improve in the next 7-10 days; however, the cough can linger for much longer.     Thank you for choosing [facility] for your care.

## 2025-01-14 NOTE — ED PROVIDER NOTES
Patient Seen in: [facility]      History     Chief Complaint   Patient presents with    Cough/URI     Stated Complaint: COUGH    Subjective:   53 yo female presents to the immediate care with c/o cough.  Patient states she started 3 days ago with nasal congestion and cough.  She states she has been taking Dayquil and Nyquil as needed for her symptoms.  Patient denies any fever, chills, ear pain, ear drainage, sore throat, difficulty swallowing, voice changes, shortness of breath, or chest pain.     The history is provided by the patient.         Objective:     Past Medical History:    Anemia    BMI 31.0-31.9,adult    Cancer (HCC)    melanoma    Fibroids    Gestational diabetes (HCC)    diet controlled    H/O myomectomy    Hypothyroidism    Melanoma (HCC)    Nausea & vomiting    Other specified hypothyroidism    Stroke (HCC)    Thyroid disease              Past Surgical History:   Procedure Laterality Date    Eye surgery      Other surgical history      2 melanoma's    Other surgical history      fibroid    Skin graft procedure                  Social History     Socioeconomic History    Marital status:    Tobacco Use    Smoking status: Never    Smokeless tobacco: Never   Vaping Use    Vaping status: Never Used   Substance and Sexual Activity    Alcohol use: Not Currently    Drug use: Never   Other Topics Concern    Caffeine Concern No    Exercise No              Review of Systems   Constitutional: Negative.  Negative for chills and fever.   HENT:  Positive for congestion, postnasal drip and sore throat. Negative for ear discharge, ear pain, rhinorrhea, trouble swallowing and voice change.    Respiratory:  Positive for cough. Negative for chest tightness and shortness of breath.    Cardiovascular: Negative.  Negative for chest pain.   All other systems reviewed and are negative.      Positive for stated complaint: COUGH  Other systems are as noted in HPI.  Constitutional and vital signs reviewed.       All other systems reviewed and negative except as noted above.    Physical Exam     ED Triage Vitals [01/14/25 1651]   BP (!) 125/94   Pulse 90   Resp 16   Temp 98 °F (36.7 °C)   Temp src Oral   SpO2 100 %   O2 Device None (Room air)       Current Vitals:   Vital Signs  BP: (!) 125/94  Pulse: 90  Resp: 16  Temp: 98 °F (36.7 °C)  Temp src: Oral    Oxygen Therapy  SpO2: 100 %  O2 Device: None (Room air)        Physical Exam  Vitals and nursing note reviewed.   Constitutional:       General: She is not in acute distress.     Appearance: Normal appearance. She is normal weight. She is not ill-appearing.   HENT:      Head: Normocephalic and atraumatic.      Right Ear: Tympanic membrane, ear canal and external ear normal.      Left Ear: Tympanic membrane, ear canal and external ear normal.      Nose: Congestion present.      Mouth/Throat:      Mouth: Mucous membranes are moist.      Pharynx: Oropharynx is clear. Posterior oropharyngeal erythema present.      Comments: Mild cobblestoning and postnasal drainage.  Eyes:      Conjunctiva/sclera: Conjunctivae normal.      Pupils: Pupils are equal, round, and reactive to light.   Cardiovascular:      Rate and Rhythm: Normal rate and regular rhythm.      Pulses: Normal pulses.      Heart sounds: Normal heart sounds.   Pulmonary:      Effort: Pulmonary effort is normal. No respiratory distress.      Breath sounds: Normal breath sounds.   Musculoskeletal:         General: Normal range of motion.   Skin:     General: Skin is warm and dry.   Neurological:      General: No focal deficit present.      Mental Status: She is alert and oriented to person, place, and time.   Psychiatric:         Mood and Affect: Mood normal.         Behavior: Behavior normal.             ED Course     Labs Reviewed   POCT FLU TEST - Normal    Narrative:     This assay is a rapid molecular in vitro test utilizing nucleic acid amplification of influenza A and B viral RNA.   RAPID SARS-COV-2 BY PCR -  Normal       ED Course as of 01/14/25 1735  ------------------------------------------------------------  Time: 01/14 1719  Value: Rapid SARS-CoV-2 by PCR  Comment: Negative.   ------------------------------------------------------------  Time: 01/14 1722  Value: POCT Flu Test  Comment: Negative.           [facility]        Medical Decision Making  54-year-old female with cough and congestion for 3 days.  COVID and flu ordered.  These are both negative.  Hx and exam are consistent with a viral infection.  No indication for any other labs, imaging, or abx.  No evidence of sepsis, strep, otitis, pneumonia, bacterial sinusitis, or other bacterial etiology.  Counseled patient on supportive management at home.  F/u with PCP in a few days.  Verbalized understanding and agreement.    Amount and/or Complexity of Data Reviewed  Labs: ordered. Decision-making details documented in ED Course.    Risk  OTC drugs.        Disposition and Plan     Clinical Impression:  1. Viral URI         Disposition:  Discharge  1/14/2025  5:28 pm    Follow-up:  Tere Ramey MD  [address] [place] [id number]  [phone]    In 1 week  As needed          Medications Prescribed:  Current Discharge Medication List              Supplementary Documentation:

## (undated) NOTE — ED AVS SNAPSHOT
Kaycee Guzman   MRN: [id number]    Department:  [facility] Emergency Department   Date of Visit:  1/4/2020           Disclosure     Insurance plans vary and the physician(s) referred by the ER may not be covered by your plan.  Please contact y tell this physician (or your personal doctor if your instructions are to return to your personal doctor) about any new or lasting problems. The primary care or specialist physician will see patients referred from the [facility] Emergency Department.  Amna Dahl

## (undated) NOTE — LETTER
Date: 9/28/2023    Patient Name: Peggy Campa          To Whom it may concern:     This letter has been written at the patient's request. The above patient was seen at the [facility] for treatment of neurological condition    This patient should be allowed to work from home for 2 weeks starting Friday 9/29/2023        Sincerely,        Sveta Arriaga MD  [address]

## (undated) NOTE — Clinical Note
Monthly Growth ultrasounds starting at 24-26 weeks and add BPP to each assessment at 32 weeks  Weekly NST's at 32 weeks  Twice weekly testing at 36 weeks  weekly NST and weekly BPP or  twice weekly NST with weekly AARTI  Delivery at 39 weeks  Fetal echocardi

## (undated) NOTE — Clinical Note
Continue care with Dr. Thompson [place]  Monthly Growth ultrasounds starting at 24-26 weeks and add BPP to each assessment at 32 weeks  Weekly NST's at 32 weeks  Twice weekly testing at 36 weeks  weekly NST and weekly BPP or  twice weekly NST with weekly AARTI  C-sect

## (undated) NOTE — LETTER
Date & Time: 1/4/2020, 1:49 PM  Patient: Pelon Cheng  Encounter Provider(s):    Liane Brown MD       To Whom It May Concern:    Pelon Cheng was seen and treated in our department on 1/4/2020.  She can return to work on Wednesday, January